# Patient Record
Sex: FEMALE | Employment: FULL TIME | URBAN - METROPOLITAN AREA
[De-identification: names, ages, dates, MRNs, and addresses within clinical notes are randomized per-mention and may not be internally consistent; named-entity substitution may affect disease eponyms.]

---

## 2017-07-19 ENCOUNTER — COMMUNICATION - HEALTHEAST (OUTPATIENT)
Dept: HEALTH INFORMATION MANAGEMENT | Facility: CLINIC | Age: 42
End: 2017-07-19

## 2018-08-02 ENCOUNTER — RADIANT APPOINTMENT (OUTPATIENT)
Dept: MAMMOGRAPHY | Facility: CLINIC | Age: 43
End: 2018-08-02
Payer: COMMERCIAL

## 2018-08-02 DIAGNOSIS — Z12.31 VISIT FOR SCREENING MAMMOGRAM: ICD-10-CM

## 2019-07-18 ENCOUNTER — OFFICE VISIT (OUTPATIENT)
Dept: INTERNAL MEDICINE | Facility: CLINIC | Age: 44
End: 2019-07-18
Payer: COMMERCIAL

## 2019-07-18 VITALS
OXYGEN SATURATION: 97 % | HEART RATE: 78 BPM | BODY MASS INDEX: 21.46 KG/M2 | WEIGHT: 125 LBS | DIASTOLIC BLOOD PRESSURE: 68 MMHG | SYSTOLIC BLOOD PRESSURE: 103 MMHG

## 2019-07-18 DIAGNOSIS — R19.5 LOOSE STOOLS: ICD-10-CM

## 2019-07-18 DIAGNOSIS — O24.410 DIET CONTROLLED GESTATIONAL DIABETES MELLITUS (GDM), ANTEPARTUM: ICD-10-CM

## 2019-07-18 DIAGNOSIS — Z13.220 SCREENING FOR HYPERLIPIDEMIA: ICD-10-CM

## 2019-07-18 DIAGNOSIS — Z00.00 ENCOUNTER FOR MEDICAL EXAMINATION TO ESTABLISH CARE: Primary | ICD-10-CM

## 2019-07-18 DIAGNOSIS — K29.50 CHRONIC GASTRITIS WITHOUT BLEEDING, UNSPECIFIED GASTRITIS TYPE: ICD-10-CM

## 2019-07-18 ASSESSMENT — PAIN SCALES - GENERAL: PAINLEVEL: NO PAIN (0)

## 2019-07-18 NOTE — NURSING NOTE
Chief Complaint   Patient presents with     Lipids     Pt would like to discuss lipid panel. Cholesterol was slightly elevated.      GI Problem     Pt would like to discuss a GI problem.      Xiao Branch LPN at 12:08 PM on 7/18/2019.

## 2019-07-18 NOTE — PROGRESS NOTES
"  PRIMARY CARE CENTER         HPI:       Nathanael Vargas is a 43 year old female with PMHx of gestational diabetes who presents for the following with: Lipids (Pt would like to discuss lipid panel. Cholesterol was slightly elevated. ); GI Problem (Pt would like to discuss a GI problem. ); and Establish Care    Patient has loose stools 1-2 times per week.  She denies any fevers, chills, abdominal pain/discomfort, bloating.  No blood or black tarry stools.  Weight has been stable.  Recently under stress because her brother is getting a divorce.  She was having loose stools almost daily for the 2 weeks when the stress was very high.  Patient is under less stress now and loose stools have decreased.  She is worried that she may be allergic to food.  Sometimes notices an \"acidic\" stomach after eating bread.  Patient also gets some enrique-oral redness after drinking soy milk.  She does not notice this reaction when she eats tofu.    Patient had vaginal delivery on 5/14/2017 c/b gestational diabetes.  She took insulin during pregnancy but did not require medication after delivery.  She periodically checks her blood sugar at home and noticed that recently it has been running higher in the 110s.      Problem, Medication and Allergy Lists were reviewed and are current.  Patient is a new patient to this clinic and so  I reviewed/updated the Past Medical History, the Family History and the Social History.          Review of Systems:     ROS  I have personally reviewed and updated the complete ROS on the day of the visit.           Physical Exam:   /68   Pulse 78   Wt 56.7 kg (125 lb)   SpO2 97%   BMI 21.46 kg/m    Body mass index is 21.46 kg/m .  Vitals were reviewed       GENERAL APPEARANCE: healthy, alert and no distress     EYES: EOMI, PERRL     HENT: ear canals and TM's normal      NECK: no adenopathy, no asymmetry, masses, or scars      RESP: lungs clear to auscultation - no rales, rhonchi or wheezes     CV: regular " rates and rhythm, normal S1 S2, no S3 or S4 and no murmur, click or rub     ABDOMEN:  soft, nontender, no HSM or masses and bowel sounds normal     MS: extremities normal- no gross deformities noted, FROM in all extremities.     SKIN: no suspicious lesions or rashes on limited exam     NEURO: Normal strength and tone, mentation intact and speech normal     PSYCH: mentation appears normal and affect normal/bright     LYMPHATICS: No cervical adenopathy        Results:      Results from the last 24 hoursNo results found for this or any previous visit (from the past 24 hour(s)).     Assessment and Plan     Nathanael was seen today for lipids, gi problem and establish care.    Diagnoses and all orders for this visit:    Encounter for medical examination to establish care  Per patient she gets her PAP and mammogram through OB.  Her last mammogram was August 2018 and was normal.  She thinks her PAP was last year and was going to check because it's not in our records.    Screening for hyperlipidemia  Discussed lifestyle modifications.  May discuss starting a statin if lifestyle changes fail to make significant improvement  -     Lipid Profile FASTING; Future    Diet controlled gestational diabetes mellitus (GDM), antepartum  Was on insulin during her last pregnancy but is no longer on any medication.  She checks her blood sugar at home periodically and has recently been in the 110s.  Will check A1C annually.  -     Hemoglobin A1c; Future    Chronic gastritis without bleeding, unspecified gastritis type  Patient has never been tested.  -     H Pylori antigen stool; Future    Loose stools  No alarm symptoms.  Seems to have some association with stress.  She will bring in the food diary to next visit to review.        -      Patient to keep a food diary        -      Gave her a FODMAP handout        -      Follow up with me in one month    Options for treatment and follow-up care were reviewed with the patient. Nathanael Minori  engaged in the decision making process and verbalized understanding of the options discussed and agreed with the final plan.    Lacey Millan MD  Jul 18, 2019    Pt was seen and plan of care discussed with Dr. Montes De Oca.     I was present during the visit and the patient was seen and examined by me in conjunction with the resident.  I discussed the pertinent history, exam, results and plan with the resident and agree with the documentation above with the following exceptions: none.    Elizabeth Montes De Oca MD

## 2019-07-18 NOTE — PATIENT INSTRUCTIONS
[facility] Medication Refill Request Information:  * Please contact your pharmacy regarding ANY request for medication refills.  ** [facility] Prescription Fax = [phone]  * Please allow 3 business days for routine medication refills.  * Please allow 5 business days for controlled substance medication refills.     [facility] Test Result notification information:  *You will be notified with in 7-10 days of your appointment day regarding the results of your test.  If you are on MyChart you will be notified as soon as the provider has reviewed the results and signed off on them.    [facility]: [phone]          [facility]         Internal Medicine Resident                   Continuity Clinic    Who We Are    Resident Continuity Clinic is a part of the [facility] Primary Care Clinic.  Resident physicians see patients independently and establish a relationship with them over the course of their three-year residency program.  As with the Primary Care Clinic, our Resident Continuity Clinic models a group practice.  If your doctor is not available, you will be able to see another resident physician.  At the end of a resident s training, patients will be transitioned to a new resident physician for ongoing care.     We treat patients with a wide array of medical needs from routine physicals, to acute illnesses, to diabetes and blood pressure management, to complex medical illness.  What is a Resident Physician?    Resident physicians hold medical degrees and are doctors. They are training to become specialists in Internal Medicine. They work under the supervision of board-certified faculty physicians.  Expectations for Your Care    We strive to provide accessible, quality care at all times.    In order to provide this care, it is best to see your primary care resident doctor consistently rather switching between providers.  In the event you do see another physician, you should schedule  a follow-up visit with your usual primary care doctor.    If you are transitioning your care from another clinic, it is helpful to have your records available for your doctor to review.    We do not prescribe controlled substances, such as ADD medications or narcotic pain medications, on your first visit.  We will review your health records and concerns prior to devising a treatment plan with you in order to provide the best care.      Clinic Services     Extended clinic hours; patient  to help navigate your visit;  parking; laboratory and imaging services with evening and weekend hours    Multiple medical and surgical specialties in one building    Complementary services, including Nutrition, Integrative Medicine, Pharmacy consultations, Mental and Behavioral Health, Sports Medicine and Physical Therapy    Thank You    We would like to thank you for choosing the [facility] Internal Medicine Resident Continuity Clinic for your primary care. You are making a priceless contribution to the training of the next generation of health care practitioners.     Contact us at [phone] for appointments or questions.    Resident Clinic Hours are Tuesdays and Thursdays, 7:30am-5:00pm    Residents   Vernon Aguirre MD  (Male)   Jonatan Akins MD   (Male)   Leeann Womack MD  (Female)  Lacey Millan MD   (Female)   Rachel Villegas MD   (Female)    Cherise Jackman MD    (Female)   Ricardo Grossman MD  (Male)   Reginald Ang MD  (Male)    Kristina Padilla MD  (Female)   Blair Chacon MD  (Female)   Jerry Becker MD    (Female)   Andrea Alberto MD  (Male)   Flo Arroyo MD  (Male)   Maxwell Horn MD  (Male)   FRANKLYN Suresh MD   (Male)   Donato Wade MD  (Male)    Alyse Vigil MD (Female)   Tad Botello MD  (Male)   Jerrod Cervantes MD  (Male)   Alvarez Weir MD  (Male)   Peyton Do MD    (Female)   Silver Harvey MD  (Female)     Supervising Physicians   MD Alex Bishop,  MD Kristi Cardenas, MD Gigi Grubbs, MD Dajuan Hutchison, MD Yanely Starks, MD Elizabeth Montes De Oca, MD Jose L Yan, MD Akua Kothari MD

## 2019-08-20 DIAGNOSIS — Z13.220 SCREENING FOR HYPERLIPIDEMIA: ICD-10-CM

## 2019-08-20 DIAGNOSIS — O24.410 DIET CONTROLLED GESTATIONAL DIABETES MELLITUS (GDM), ANTEPARTUM: ICD-10-CM

## 2019-08-20 LAB
CHOLEST SERPL-MCNC: 196 MG/DL
HBA1C MFR BLD: 5.9 % (ref 0–5.6)
HDLC SERPL-MCNC: 52 MG/DL
LDLC SERPL CALC-MCNC: 122 MG/DL
NONHDLC SERPL-MCNC: 144 MG/DL
TRIGL SERPL-MCNC: 108 MG/DL

## 2019-08-21 DIAGNOSIS — K29.50 CHRONIC GASTRITIS WITHOUT BLEEDING, UNSPECIFIED GASTRITIS TYPE: ICD-10-CM

## 2019-08-22 LAB — H PYLORI AG STL QL IA: NEGATIVE

## 2019-09-03 ENCOUNTER — TELEPHONE (OUTPATIENT)
Dept: INTERNAL MEDICINE | Facility: CLINIC | Age: 44
End: 2019-09-03

## 2019-09-03 NOTE — TELEPHONE ENCOUNTER
New right eye floaters and flashing starting 2 weeks ago  Flashing less frequent now  Many small floaters with one larger floaters  Mostly unchanged in past week  Visual acuity feels worse-- still able to read    No shadow/curtain in vision    No eye pain    Pt unable to come tomorrow AM  Scheduled Thursday with Dr. Guevara at [facility] location  Pt aware of date/time/location  Pt aware to call for any sudden vision changes    Frantz Redman RN RN 1:31 PM 09/03/19          [facility] Call Center    Phone Message    May a detailed message be left on voicemail: yes    Reason for Call: Symptoms or Concerns     If patient has red-flag symptoms, warm transfer to triage line    Current symptom or concern: Floaters and Flashers    Symptoms have been present for:  2 week(s)    Has patient previously been seen for this? No    By PCP    Date:     Are there any new or worsening symptoms? Yes:       Action Taken: Message routed to:  [facility] ([facility] Pt would like to schedule a new appt. in the clinic for this. Please call her as soon as possible to discuss

## 2019-09-05 ENCOUNTER — OFFICE VISIT (OUTPATIENT)
Dept: OPHTHALMOLOGY | Facility: CLINIC | Age: 44
End: 2019-09-05
Attending: OPHTHALMOLOGY
Payer: COMMERCIAL

## 2019-09-05 DIAGNOSIS — H11.131 PRIMARY ACQUIRED MELANOSIS OF CONJUNCTIVA OF RIGHT EYE: ICD-10-CM

## 2019-09-05 DIAGNOSIS — H52.13 MYOPIA OF BOTH EYES: Primary | ICD-10-CM

## 2019-09-05 DIAGNOSIS — H43.391 VITREOUS SYNERESIS OF RIGHT EYE: ICD-10-CM

## 2019-09-05 PROBLEM — Z86.32 HISTORY OF INSULIN CONTROLLED GESTATIONAL DIABETES MELLITUS (GDM): Status: ACTIVE | Noted: 2017-07-20

## 2019-09-05 PROBLEM — O46.90 VAGINAL BLEEDING IN PREGNANCY: Status: ACTIVE | Noted: 2019-09-05

## 2019-09-05 PROBLEM — Z34.80 PRENATAL CARE, SUBSEQUENT PREGNANCY: Status: ACTIVE | Noted: 2019-09-05

## 2019-09-05 PROCEDURE — G0463 HOSPITAL OUTPT CLINIC VISIT: HCPCS | Mod: ZF

## 2019-09-05 ASSESSMENT — VISUAL ACUITY
OD_CC: 20/25
OD_CC+: -2
OS_CC: 20/20
METHOD: SNELLEN - LINEAR
CORRECTION_TYPE: GLASSES

## 2019-09-05 ASSESSMENT — TONOMETRY
OD_IOP_MMHG: 18
IOP_METHOD: TONOPEN
OS_IOP_MMHG: 18

## 2019-09-05 ASSESSMENT — SLIT LAMP EXAM - LIDS
COMMENTS: NORMAL
COMMENTS: NORMAL

## 2019-09-05 ASSESSMENT — REFRACTION_WEARINGRX
OD_AXIS: 170
OD_CYLINDER: +0.25
SPECS_TYPE: SVL
OD_SPHERE: -6.00
OS_SPHERE: -6.00
OS_CYLINDER: +0.50

## 2019-09-05 ASSESSMENT — CONF VISUAL FIELD
METHOD: COUNTING FINGERS
OD_NORMAL: 1
OS_NORMAL: 1

## 2019-09-05 ASSESSMENT — EXTERNAL EXAM - LEFT EYE: OS_EXAM: NORMAL

## 2019-09-05 ASSESSMENT — EXTERNAL EXAM - RIGHT EYE: OD_EXAM: NORMAL

## 2019-09-05 NOTE — PROGRESS NOTES
HPI  Nathanael Vargas is a 44 year old female who presents for right eye floaters and flashes past 2 weeks in superior field of her vision.     Last eye exam 1 year ago.     POH: High myopia  GTTs: None  PMH: Gestational diabetes in past but no current DM or HTN  FH: No glaucoma or AMD  SH: Non-smoker    Assessment & Plan    1. Vitreous syneresis   - No PVD   - No retinal tear on SD exam   - RD precautions discussed    2. High myopia    3. CHUCKIE OD   - 9:00, flat   - Observe  -----------------------------------------------------------------------------------      Return 1 year    Shivam Guevara M.D.  Ophthalmology, PGY-4    Attending Physician Attestation:  Complete documentation of historical and exam elements from today's encounter can be found in the full encounter summary report (not reduplicated in this progress note).  I personally obtained the chief complaint(s) and history of present illness.  I confirmed and edited as necessary the review of systems, past medical/surgical history, family history, social history, and examination findings as documented by others; and I examined the patient myself.  I personally reviewed the relevant tests, images, and reports as documented above.  I formulated and edited as necessary the assessment and plan and discussed the findings and management plan with the patient and family. . - Kenneth Vines MD

## 2019-10-17 ENCOUNTER — COMMUNICATION - HEALTHEAST (OUTPATIENT)
Dept: TELEHEALTH | Facility: CLINIC | Age: 44
End: 2019-10-17

## 2019-10-17 ENCOUNTER — AMBULATORY - HEALTHEAST (OUTPATIENT)
Dept: NURSING | Facility: CLINIC | Age: 44
End: 2019-10-17

## 2019-11-07 ENCOUNTER — HEALTH MAINTENANCE LETTER (OUTPATIENT)
Age: 44
End: 2019-11-07

## 2020-11-29 ENCOUNTER — HEALTH MAINTENANCE LETTER (OUTPATIENT)
Age: 45
End: 2020-11-29

## 2021-02-14 ENCOUNTER — HEALTH MAINTENANCE LETTER (OUTPATIENT)
Age: 46
End: 2021-02-14

## 2021-04-15 ENCOUNTER — AMBULATORY - HEALTHEAST (OUTPATIENT)
Dept: NURSING | Facility: CLINIC | Age: 46
End: 2021-04-15

## 2021-04-29 ENCOUNTER — OFFICE VISIT - HEALTHEAST (OUTPATIENT)
Dept: INTERNAL MEDICINE | Facility: CLINIC | Age: 46
End: 2021-04-29

## 2021-04-29 DIAGNOSIS — Z00.00 ROUTINE GENERAL MEDICAL EXAMINATION AT A HEALTH CARE FACILITY: ICD-10-CM

## 2021-04-29 DIAGNOSIS — Z12.31 VISIT FOR SCREENING MAMMOGRAM: ICD-10-CM

## 2021-04-29 DIAGNOSIS — Z13.1 SCREENING FOR DIABETES MELLITUS: ICD-10-CM

## 2021-04-29 DIAGNOSIS — E78.5 HYPERLIPIDEMIA LDL GOAL <100: ICD-10-CM

## 2021-04-29 DIAGNOSIS — Z86.32 HISTORY OF INSULIN CONTROLLED GESTATIONAL DIABETES MELLITUS (GDM): ICD-10-CM

## 2021-04-29 DIAGNOSIS — R59.1 LYMPHADENOPATHY: ICD-10-CM

## 2021-04-29 DIAGNOSIS — K52.9 CHRONIC DIARRHEA: ICD-10-CM

## 2021-04-29 ASSESSMENT — MIFFLIN-ST. JEOR: SCORE: 1276.7

## 2021-05-04 ENCOUNTER — HOSPITAL ENCOUNTER (OUTPATIENT)
Dept: MAMMOGRAPHY | Facility: CLINIC | Age: 46
Discharge: HOME | End: 2021-05-04
Attending: INTERNAL MEDICINE
Payer: COMMERCIAL

## 2021-05-04 DIAGNOSIS — Z12.31 VISIT FOR SCREENING MAMMOGRAM: ICD-10-CM

## 2021-05-05 ENCOUNTER — AMBULATORY - HEALTHEAST (OUTPATIENT)
Dept: LAB | Facility: CLINIC | Age: 46
End: 2021-05-05

## 2021-05-05 DIAGNOSIS — E78.5 HYPERLIPIDEMIA LDL GOAL <100: ICD-10-CM

## 2021-05-05 DIAGNOSIS — Z13.1 SCREENING FOR DIABETES MELLITUS: ICD-10-CM

## 2021-05-05 DIAGNOSIS — Z86.32 HISTORY OF INSULIN CONTROLLED GESTATIONAL DIABETES MELLITUS (GDM): ICD-10-CM

## 2021-05-05 DIAGNOSIS — R59.1 LYMPHADENOPATHY: ICD-10-CM

## 2021-05-05 LAB
ALBUMIN SERPL-MCNC: 3.8 G/DL (ref 3.5–5)
ALP SERPL-CCNC: 69 U/L (ref 45–120)
ALT SERPL W P-5'-P-CCNC: 46 U/L (ref 0–45)
ANION GAP SERPL CALCULATED.3IONS-SCNC: 7 MMOL/L (ref 5–18)
AST SERPL W P-5'-P-CCNC: 35 U/L (ref 0–40)
BASOPHILS # BLD AUTO: 0.1 THOU/UL (ref 0–0.2)
BASOPHILS NFR BLD AUTO: 1 % (ref 0–2)
BILIRUB SERPL-MCNC: 0.4 MG/DL (ref 0–1)
BUN SERPL-MCNC: 12 MG/DL (ref 8–22)
CALCIUM SERPL-MCNC: 8.5 MG/DL (ref 8.5–10.5)
CHLORIDE BLD-SCNC: 106 MMOL/L (ref 98–107)
CHOLEST SERPL-MCNC: 218 MG/DL
CO2 SERPL-SCNC: 27 MMOL/L (ref 22–31)
CREAT SERPL-MCNC: 0.68 MG/DL (ref 0.6–1.1)
EOSINOPHIL # BLD AUTO: 0.3 THOU/UL (ref 0–0.4)
EOSINOPHIL NFR BLD AUTO: 6 % (ref 0–6)
ERYTHROCYTE [DISTWIDTH] IN BLOOD BY AUTOMATED COUNT: 11.4 % (ref 11–14.5)
FASTING STATUS PATIENT QL REPORTED: YES
GFR SERPL CREATININE-BSD FRML MDRD: >60 ML/MIN/1.73M2
GLUCOSE BLD-MCNC: 99 MG/DL (ref 70–125)
HBA1C MFR BLD: 5.5 %
HCT VFR BLD AUTO: 38.8 % (ref 35–47)
HDLC SERPL-MCNC: 46 MG/DL
HGB BLD-MCNC: 12.9 G/DL (ref 12–16)
IMM GRANULOCYTES # BLD: 0 THOU/UL
IMM GRANULOCYTES NFR BLD: 0 %
LDLC SERPL CALC-MCNC: 132 MG/DL
LYMPHOCYTES # BLD AUTO: 2 THOU/UL (ref 0.8–4.4)
LYMPHOCYTES NFR BLD AUTO: 35 % (ref 20–40)
MCH RBC QN AUTO: 31.2 PG (ref 27–34)
MCHC RBC AUTO-ENTMCNC: 33.2 G/DL (ref 32–36)
MCV RBC AUTO: 94 FL (ref 80–100)
MONOCYTES # BLD AUTO: 0.3 THOU/UL (ref 0–0.9)
MONOCYTES NFR BLD AUTO: 5 % (ref 2–10)
NEUTROPHILS # BLD AUTO: 2.9 THOU/UL (ref 2–7.7)
NEUTROPHILS NFR BLD AUTO: 53 % (ref 50–70)
PLATELET # BLD AUTO: 298 THOU/UL (ref 140–440)
PMV BLD AUTO: 9.7 FL (ref 7–10)
POTASSIUM BLD-SCNC: 3.9 MMOL/L (ref 3.5–5)
PROT SERPL-MCNC: 6.7 G/DL (ref 6–8)
RBC # BLD AUTO: 4.14 MILL/UL (ref 3.8–5.4)
SODIUM SERPL-SCNC: 140 MMOL/L (ref 136–145)
TRIGL SERPL-MCNC: 198 MG/DL
TSH SERPL DL<=0.005 MIU/L-ACNC: 2.06 UIU/ML (ref 0.3–5)
WBC: 5.5 THOU/UL (ref 4–11)

## 2021-05-06 ENCOUNTER — AMBULATORY - HEALTHEAST (OUTPATIENT)
Dept: NURSING | Facility: CLINIC | Age: 46
End: 2021-05-06

## 2021-05-11 ENCOUNTER — RECORDS - HEALTHEAST (OUTPATIENT)
Dept: RADIOLOGY | Facility: CLINIC | Age: 46
End: 2021-05-11

## 2021-06-05 VITALS
DIASTOLIC BLOOD PRESSURE: 82 MMHG | SYSTOLIC BLOOD PRESSURE: 118 MMHG | BODY MASS INDEX: 23.21 KG/M2 | WEIGHT: 139.3 LBS | HEART RATE: 84 BPM | HEIGHT: 65 IN | OXYGEN SATURATION: 98 %

## 2021-06-17 NOTE — PATIENT INSTRUCTIONS - HE
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  She comes in today to investigate a tiny palpable lymph node left side of the neck, just below the ear, which I am quite convinced is entirely benign, and could have enlarged as a physiologic immune response to the Pfizer COVID-19 vaccine that she received on April 15, 2021.  Going issue by issue:    Benign lymph node left side of neck.  I told her that lymph nodes can fluctuate in size in response to immune system stimuli such as a vaccine or a viral infection.  She is not experiencing any fever or other lumps or bumps anywhere that would raise a concern for malignancy or disseminated infection.  She does need routine laboratory tests, so I will include a complete blood count with differential, as well as a metabolic panel.  Her thyroid is palpably normal.    I believe she can go ahead and get her second dose of Pfizer COVID-19 vaccine in the first or second week of May.    History of gestational diabetes with her second pregnancy.  This was in 2017, and she had an elevated A1c of 5.9.  She did take insulin during pregnancy, but no hypoglycemic medication after delivery.  I am going to include an A1c on her next set of blood work.  I told her that gestational diabetes does increase her risk for developing future type 2 diabetes.  It is important that she maintain healthy lifestyle as she ages, which means controlling carbohydrate intake, control weight, and get plenty of exercise.  I reminded her that persons of  ancestry are more prone to develop type 2 diabetes with weight gain.    Loose watery stools, which has been a longstanding phenomena greater than a decade.  She describes 1 or sometimes 2 large loose BMs.  She avoids lactose.  TSH thyroid test was normal when checked in 2017, but I will include that on this  round of blood testing.  An H. pylori stool antigen test was negative August 2019.    Hyperlipidemia with elevated LDL cholesterol.  Lipid panel from December 2017 had an LDL of 150.  It wa s 122 when measured August 2019. We will check a fasting lipid panel on this visit  In her favor, she has no history of smoking.    Fibrous breast.  Screening mammogram done with tomosynthesis in August 2018 reported heterogeneously dense breast tissue, otherwise negative.  She is age 45, and probably should get another mammogram this year, and then after age 50, screening mammography is done annually.    Other preventive health.  I told her that average risk colon cancer screening can start at age 50.  This could be done with colonoscopy (every 10 years, unless polyps are found) or stool test (Cologuard) which done every 3 years.    She is not yet in menopause, still has periods.  When she enters menopause, that would be the time to get a baseline bone mineral density scan.  I would suggest that she continue to have an annual exam with her gynecologist.

## 2021-06-17 NOTE — PROGRESS NOTES
Office Visit - New Patient   Geovanna Vargas   45 y.o.  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  She comes in today to investigate a tiny palpable lymph node left side of the neck, just below the ear, which I am quite convinced is entirely benign, and could have enlarged as a physiologic immune response to the Pfizer COVID-19 vaccine that she received on April 15, 2021.  Going issue by issue:    Benign lymph node left side of neck.  I told her that lymph nodes can fluctuate in size in response to immune system stimuli such as a vaccine or a viral infection.  She is not experiencing any fever or other lumps or bumps anywhere that would raise a concern for malignancy or disseminated infection.  She does need routine laboratory tests, so I will include a complete blood count with differential, as well as a metabolic panel.  Her thyroid is palpably normal.    I believe she can go ahead and get her second dose of Pfizer COVID-19 vaccine in the first or second week of May.    History of gestational diabetes with her second pregnancy.  This was in 2017, and she had an elevated A1c of 5.9.  She did take insulin during pregnancy, but no hypoglycemic medication after delivery.  I am going to include an A1c on her next set of blood work.  I told her that gestational diabetes does increase her risk for developing future type 2 diabetes.  It is important that she maintain healthy lifestyle as she ages, which means controlling carbohydrate intake, control weight, and get plenty of exercise.  I reminded her that persons of  ancestry are more prone to develop type 2 diabetes with weight gain.    Loose watery stools, which has been a longstanding phenomena greater than a decade.  She  describes 1 or sometimes 2 large loose BMs.  She avoids lactose.  TSH thyroid test was normal when checked in 2017, but I will include that on this round of blood testing.  An H. pylori stool antigen test was negative August 2019.    Hyperlipidemia with elevated LDL cholesterol.  Lipid panel from December 2017 had an LDL of 150.  It wa s 122 when measured August 2019. We will check a fasting lipid panel on this visit  In her favor, she has no history of smoking.    Fibrous breast.  Screening mammogram done with tomosynthesis in August 2018 reported heterogeneously dense breast tissue, otherwise negative.  She is age 45, and probably should get another mammogram this year, and then after age 50, screening mammography is done annually.    Other preventive health.  I told her that average risk colon cancer screening can start at age 50.  This could be done with colonoscopy (every 10 years, unless polyps are found) or stool test (Cologuard) which done every 3 years.    She is not yet in menopause, still has periods.  When she enters menopause, that would be the time to get a baseline bone mineral density scan.  I would suggest that she continue to have an annual exam with her gynecologist.      ---------------------------------------------------------------------------------------------------------------------------  Chief Complaint   Chief Complaint   Patient presents with     Establish Care     Concerns regarding lumps on neck        ---------------------------------------------------------------------------------------------------------------------------  History of Present Illness  This 45 y.o. 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  She comes in today to investigate a tiny palpable lymph node left side of the neck, just below the ear, which I am quite  convinced is entirely benign, and could have enlarged as a physiologic immune response to the Pfizer COVID-19 vaccine that she received on April 15, 2021.  Going issue by issue:    Benign lymph node left side of neck.  I told her that lymph nodes can fluctuate in size in response to immune system stimuli such as a vaccine or a viral infection.  She is not experiencing any fever or other lumps or bumps anywhere that would raise a concern for malignancy or disseminated infection.  She does need routine laboratory tests, so I will include a complete blood count with differential, as well as a metabolic panel.  Her thyroid is palpably normal.    I believe she can go ahead and get her second dose of Pfizer COVID-19 vaccine in the first or second week of May.      Swollen lymph node left side of neck  Got  COVID-19,PF,Pfizer 04/15/2021     1 hour later felt a fullness left side of neck  Feels OK  No fever    Immunization History   Administered Date(s) Administered     COVID-19,PF,Pfizer 04/15/2021     INFLUENZA,SEASONAL QUAD, PF, =/> 6months 2017     Influenza, inj, historic,unspecified 10/13/2016     Influenza,seasonal,quad inj =/> 6months 10/30/2015, 10/17/2019     Tdap 2014, 2017     Historically healthy    History gestational diabetes mellitus (GDM)  19 0940     Hemoglobin A1C 0 - 5.6 % 5.9High     Patient had vaginal delivery on 2017 c/b gestational diabetes. She took insulin during pregnancy but did not require medication after delivery.     Children 7 and 4    Watery stools  1-2 loose watery BM's  > 10 years  Wt Readings from Last 3 Encounters:   21 139 lb 4.8 oz (63.2 kg)   Avoids lactose  Normal TSH when checked 2017    H pylori negatve   19 1100    Helicobacter pylori Antigen Stool NEG^Negative  Negative     Hyperlipidemia  Lipid profile 2017 had total cholesterol 234, triglycerides 102, HDL 64,      19 0940    Cholesterol <200 mg/dL 196     Triglycerides <150 mg/dL 108    HDL Cholesterol >49 mg/dL 52    LDL Cholesterol Calculated <100 mg/dL 122High       Fibrocystic breast  Screening mammogram with tomosynthesis 8/3/2018 heterogeneously dense breast tissue, otherwise negative      Wt Readings from Last 3 Encounters:   04/29/21 139 lb 4.8 oz (63.2 kg)     BP Readings from Last 3 Encounters:   04/29/21 118/82       No results found for: WBC, HGB, HCT, PLT, CHOL, TRIG, HDL, LDLDIRECT, ALT, AST, NA, K, CL, CREATININE, BUN, CO2, TSH, PSA, INR, GLUF, HGBA1C, MICROALBUR      Review of Systems: A comprehensive review of systems was negative except as noted.  ---------------------------------------------------------------------------------------------------------------------------    Medications and Allergies   Current Outpatient Medications   Medication Sig Dispense Refill     multivitamin with minerals (THERA-M) 9 mg iron-400 mcg Tab tablet Take 1 tablet by mouth daily.       No current facility-administered medications for this visit.      Allergies   Allergen Reactions     Lactase      Pt is allergic to Soy Milk     Latex Itching     Other Environmental Allergy         Active Ambulatory Problems     Diagnosis Date Noted     Joint Pain In The Toes      Resolved Ambulatory Problems     Diagnosis Date Noted     No Resolved Ambulatory Problems     No Additional Past Medical History     No past surgical history on file.   No past medical history on file.     Family and Social History   Family History   Problem Relation Age of Onset     Diabetes Maternal Grandmother      Kidney cancer Maternal Grandmother      Diabetes Paternal Uncle      Diabetes Paternal Uncle         Social History     Tobacco Use     Smoking status: Never Smoker     Smokeless tobacco: Never Used   Substance Use Topics     Alcohol use: Not on file     Drug use: Not on file        Physical Exam   General Appearance:   Appears well    /82 (Patient Site: Right Arm, Patient Position:  "Sitting, Cuff Size: Adult Regular)   Pulse 84   Ht 5' 5.25\" (1.657 m)   Wt 139 lb 4.8 oz (63.2 kg)   SpO2 98%   BMI 23.00 kg/m      General: Alert, in no distress  Skin: No significant lesion seen.  Eyes/nose/throat: Eyes without scleral icterus, eye movements normal, pupils equal and reactive, oropharynx clear, ears with normal TM's  MSK: Neck with good ROM  Lymphatic: + Benign feeling 1 cm lymph node just below her left ear, no other adenopathy appreciated.   Normal to palpation supraclavicular areas, axillae, liver, and spleen  Endocrine: Thyroid with no nodules to palpation  Pulm: Lungs clear to auscultation bilaterally  Cardiac: Heart with regular rate and rhythm, no murmur or gallop  GI: Abdomen soft, nontender. No palpable enlargement of liver or spleen  MSK: Extremities no tenderness or edema  Neuro: Moves all extremities, without focal weakness  Psych: Alert, normal mental status. Normal affect and speech         Additional Information        Woo Perkins MD  Internal Medicine      "

## 2021-09-23 ENCOUNTER — VIRTUAL VISIT (OUTPATIENT)
Dept: INTERNAL MEDICINE | Facility: CLINIC | Age: 46
End: 2021-09-23
Payer: COMMERCIAL

## 2021-09-23 DIAGNOSIS — Z80.0 FAMILY HISTORY OF PANCREATIC CANCER: ICD-10-CM

## 2021-09-23 DIAGNOSIS — R40.4 SPELL OF ALTERED CONSCIOUSNESS: Primary | ICD-10-CM

## 2021-09-23 DIAGNOSIS — K58.0 IRRITABLE BOWEL SYNDROME WITH DIARRHEA: ICD-10-CM

## 2021-09-23 PROCEDURE — 99214 OFFICE O/P EST MOD 30 MIN: CPT | Mod: 95 | Performed by: INTERNAL MEDICINE

## 2021-09-23 NOTE — PATIENT INSTRUCTIONS
1.  Brief spell lasting a few seconds that occurred in late August, when she was standing cooking dinner.  She felt lost control of her body, did not lose consciousness, felt kind of zoned out, but then a few seconds later felt back to normal.  No pain, no shortness of breath.    I told her that I do not have a lot to go on in terms of symptomatology to figure out what sort of work-up to do for the spell.  He does not recurred in the last month or so, and she has felt generally well.  I asked her to write down the details of a future spell, which might direct the work-up more in the neurologic versus cardiovascular direction.    2.  Mother recently diagnosed with pancreas cancer.  Professor Vargas asked about screening test for pancreas cancer and I told her that unfortunately there is not a screening regimen for pancreatic cancer, but I told her to be on the look out for any symptoms that would raise such concerns, and then we would pursue a diagnostic work-up.   Sam does not drink alcohol, has no history of chronic pancreatitis, and does not have diabetes.    3.  Sporadic approximately weekly loose stools or diarrhea, which I think is irritable bowel syndrome.  Professor Vargas told me that multiple family members have similar bowel patterns.  I told her to be on the look out for triggering foods such as caffeine, lactose, fatty foods.  I also told her about the role of sleep sermons or anxiety as potential triggers of irritable bowel syndrome.  I suggested she keep a symptom diary, to try to find any clues in terms of triggers.

## 2021-09-23 NOTE — PROGRESS NOTES
Nathanael is a 46 year old who is being evaluated via a billable video visit.      How would you like to obtain your AVS? MyChart  If the video visit is dropped, the invitation should be resent by: Text to cell phone: [phone]  Will anyone else be joining your video visit? No      Video Start Time: 9:30 AM      Jazz Huffman is a 46 year old who presents for the following health issues     Video visit with Professor Vargas, who had 3 symptoms that she wants to discuss.    During video visit she appeared well.    1.  Brief spell lasting a few seconds that occurred in late August, when she was standing cooking dinner.  She felt lost control of her body, did not lose consciousness, felt kind of zoned out, but then a few seconds later felt back to normal.  No pain, no shortness of breath.    I told her that I do not have a lot to go on in terms of symptomatology to figure out what sort of work-up to do for the spell.  He does not recurred in the last month or so, and she has felt generally well.  I asked her to write down the details of a future spell, which might direct the work-up more in the neurologic versus cardiovascular direction.    2.  Mother recently diagnosed with pancreas cancer.  Professor Vargas asked about screening test for pancreas cancer and I told her that unfortunately there is not a screening regimen for pancreatic cancer, but I told her to be on the look out for any symptoms that would raise such concerns, and then we would pursue a diagnostic work-up.   Sam does not drink alcohol, has no history of chronic pancreatitis, and does not have diabetes.    3.  Sporadic approximately weekly loose stools or diarrhea, which I think is irritable bowel syndrome.  Professor Vargas told me that multiple family members have similar bowel patterns.  I told her to be on the look out for triggering foods such as caffeine, lactose, fatty foods.  I also told her about the role of sleep sermons or  anxiety as potential triggers of irritable bowel syndrome.  I suggested she keep a symptom diary, to try to find any clues in terms of triggers.          Video-Visit Details    Type of service:  Video Visit    Video End Time:9:50 AM    Originating Location (pt. Location): Home    Distant Location (provider location):  [facility]     Platform used for Video Visit: [employer]ann[employer]

## 2021-09-25 ENCOUNTER — HEALTH MAINTENANCE LETTER (OUTPATIENT)
Age: 46
End: 2021-09-25

## 2021-11-09 ENCOUNTER — IMMUNIZATION (OUTPATIENT)
Dept: NURSING | Facility: CLINIC | Age: 46
End: 2021-11-09
Payer: COMMERCIAL

## 2021-11-09 PROCEDURE — 0004A PR COVID VAC PFIZER DIL RECON 30 MCG/0.3 ML IM: CPT

## 2021-11-09 PROCEDURE — 91300 PR COVID VAC PFIZER DIL RECON 30 MCG/0.3 ML IM: CPT

## 2022-01-13 ENCOUNTER — TRANSFERRED RECORDS (OUTPATIENT)
Dept: HEALTH INFORMATION MANAGEMENT | Facility: CLINIC | Age: 47
End: 2022-01-13
Payer: COMMERCIAL

## 2022-01-15 ENCOUNTER — HEALTH MAINTENANCE LETTER (OUTPATIENT)
Age: 47
End: 2022-01-15

## 2022-03-23 ENCOUNTER — TRANSFERRED RECORDS (OUTPATIENT)
Dept: HEALTH INFORMATION MANAGEMENT | Facility: CLINIC | Age: 47
End: 2022-03-23
Payer: COMMERCIAL

## 2022-06-14 ENCOUNTER — TRANSFERRED RECORDS (OUTPATIENT)
Dept: HEALTH INFORMATION MANAGEMENT | Facility: CLINIC | Age: 47
End: 2022-06-14
Payer: COMMERCIAL

## 2022-06-14 LAB — TSH SERPL-ACNC: 2.61 UIU/ML (ref 0.45–4.5)

## 2022-08-09 ENCOUNTER — TRANSFERRED RECORDS (OUTPATIENT)
Dept: HEALTH INFORMATION MANAGEMENT | Facility: CLINIC | Age: 47
End: 2022-08-09

## 2022-08-19 ENCOUNTER — TRANSFERRED RECORDS (OUTPATIENT)
Dept: HEALTH INFORMATION MANAGEMENT | Facility: CLINIC | Age: 47
End: 2022-08-19

## 2022-08-21 ENCOUNTER — HEALTH MAINTENANCE LETTER (OUTPATIENT)
Age: 47
End: 2022-08-21

## 2022-12-26 ENCOUNTER — HEALTH MAINTENANCE LETTER (OUTPATIENT)
Age: 47
End: 2022-12-26

## 2023-04-16 ENCOUNTER — HEALTH MAINTENANCE LETTER (OUTPATIENT)
Age: 48
End: 2023-04-16

## 2023-04-23 ENCOUNTER — OFFICE VISIT (OUTPATIENT)
Dept: FAMILY MEDICINE | Facility: CLINIC | Age: 48
End: 2023-04-23
Payer: COMMERCIAL

## 2023-04-23 VITALS
SYSTOLIC BLOOD PRESSURE: 112 MMHG | WEIGHT: 142.4 LBS | BODY MASS INDEX: 23.52 KG/M2 | TEMPERATURE: 97.6 F | OXYGEN SATURATION: 96 % | HEART RATE: 81 BPM | DIASTOLIC BLOOD PRESSURE: 74 MMHG

## 2023-04-23 DIAGNOSIS — R07.0 THROAT PAIN: Primary | ICD-10-CM

## 2023-04-23 LAB
DEPRECATED S PYO AG THROAT QL EIA: NEGATIVE
GROUP A STREP BY PCR: NOT DETECTED

## 2023-04-23 PROCEDURE — 87651 STREP A DNA AMP PROBE: CPT | Performed by: PHYSICIAN ASSISTANT

## 2023-04-23 PROCEDURE — 99213 OFFICE O/P EST LOW 20 MIN: CPT | Performed by: PHYSICIAN ASSISTANT

## 2023-04-23 NOTE — PATIENT INSTRUCTIONS
Throat    Rapid Strep test was negative.     If overnight test returns positive, we will call tomorrow and call in antibiotic prescription.    No notification means that overnight test returned negative.    Symptoms are likely due to viral infection that will resolve on its own in 1-2 weeks.    May continue with symptomatic treatments including:  - Salt water gargles  - Warm beverages such as a non-caffeinated tea with honey  - Throat drops  - Ibuprofen or acetaminophen for pain or fever relief    If fevers not coming down with acetaminophen or ibuprofen, shortness of breath, not tolerating oral liquid intake, drooling, or stiff neck, return for re-evaluation immediately. Otherwise, if no improvement in the next week, follow up with your primary care provider.

## 2023-06-01 ENCOUNTER — MYC MEDICAL ADVICE (OUTPATIENT)
Dept: INTERNAL MEDICINE | Facility: CLINIC | Age: 48
End: 2023-06-01

## 2023-06-01 ENCOUNTER — OFFICE VISIT (OUTPATIENT)
Dept: INTERNAL MEDICINE | Facility: CLINIC | Age: 48
End: 2023-06-01
Payer: COMMERCIAL

## 2023-06-01 VITALS
HEART RATE: 69 BPM | DIASTOLIC BLOOD PRESSURE: 60 MMHG | HEIGHT: 65 IN | WEIGHT: 140.7 LBS | SYSTOLIC BLOOD PRESSURE: 100 MMHG | OXYGEN SATURATION: 99 % | RESPIRATION RATE: 16 BRPM | BODY MASS INDEX: 23.44 KG/M2 | TEMPERATURE: 97.4 F

## 2023-06-01 DIAGNOSIS — E78.00 ELEVATED LDL CHOLESTEROL LEVEL: ICD-10-CM

## 2023-06-01 DIAGNOSIS — Z86.32 HISTORY OF GESTATIONAL DIABETES: ICD-10-CM

## 2023-06-01 DIAGNOSIS — E78.00 ELEVATED LDL CHOLESTEROL LEVEL: Primary | ICD-10-CM

## 2023-06-01 DIAGNOSIS — R53.82 CHRONIC FATIGUE: ICD-10-CM

## 2023-06-01 DIAGNOSIS — R41.840 DIFFICULTY CONCENTRATING: Primary | ICD-10-CM

## 2023-06-01 DIAGNOSIS — K58.0 IRRITABLE BOWEL SYNDROME WITH DIARRHEA: ICD-10-CM

## 2023-06-01 DIAGNOSIS — Z12.31 VISIT FOR SCREENING MAMMOGRAM: ICD-10-CM

## 2023-06-01 LAB
ALBUMIN SERPL BCG-MCNC: 4.4 G/DL (ref 3.5–5.2)
ALP SERPL-CCNC: 62 U/L (ref 35–104)
ALT SERPL W P-5'-P-CCNC: 14 U/L (ref 10–35)
ANION GAP SERPL CALCULATED.3IONS-SCNC: 11 MMOL/L (ref 7–15)
AST SERPL W P-5'-P-CCNC: 26 U/L (ref 10–35)
BILIRUB SERPL-MCNC: 0.6 MG/DL
BUN SERPL-MCNC: 12.5 MG/DL (ref 6–20)
CALCIUM SERPL-MCNC: 9.4 MG/DL (ref 8.6–10)
CHLORIDE SERPL-SCNC: 103 MMOL/L (ref 98–107)
CHOLEST SERPL-MCNC: 252 MG/DL
CREAT SERPL-MCNC: 0.63 MG/DL (ref 0.51–0.95)
DEPRECATED HCO3 PLAS-SCNC: 22 MMOL/L (ref 22–29)
ERYTHROCYTE [DISTWIDTH] IN BLOOD BY AUTOMATED COUNT: 11.7 % (ref 10–15)
GFR SERPL CREATININE-BSD FRML MDRD: >90 ML/MIN/1.73M2
GLUCOSE SERPL-MCNC: 105 MG/DL (ref 70–99)
HBA1C MFR BLD: 5.3 % (ref 0–5.6)
HCT VFR BLD AUTO: 41.4 % (ref 35–47)
HDLC SERPL-MCNC: 43 MG/DL
HGB BLD-MCNC: 13.8 G/DL (ref 11.7–15.7)
LDLC SERPL CALC-MCNC: 159 MG/DL
MCH RBC QN AUTO: 30.9 PG (ref 26.5–33)
MCHC RBC AUTO-ENTMCNC: 33.3 G/DL (ref 31.5–36.5)
MCV RBC AUTO: 93 FL (ref 78–100)
NONHDLC SERPL-MCNC: 209 MG/DL
PLATELET # BLD AUTO: 331 10E3/UL (ref 150–450)
POTASSIUM SERPL-SCNC: 4.6 MMOL/L (ref 3.4–5.3)
PROT SERPL-MCNC: 7.3 G/DL (ref 6.4–8.3)
RBC # BLD AUTO: 4.47 10E6/UL (ref 3.8–5.2)
SODIUM SERPL-SCNC: 136 MMOL/L (ref 136–145)
TRIGL SERPL-MCNC: 248 MG/DL
TSH SERPL DL<=0.005 MIU/L-ACNC: 3.19 UIU/ML (ref 0.3–4.2)
WBC # BLD AUTO: 6.6 10E3/UL (ref 4–11)

## 2023-06-01 PROCEDURE — 36415 COLL VENOUS BLD VENIPUNCTURE: CPT | Performed by: INTERNAL MEDICINE

## 2023-06-01 PROCEDURE — 80061 LIPID PANEL: CPT | Performed by: INTERNAL MEDICINE

## 2023-06-01 PROCEDURE — 83036 HEMOGLOBIN GLYCOSYLATED A1C: CPT | Performed by: INTERNAL MEDICINE

## 2023-06-01 PROCEDURE — 99214 OFFICE O/P EST MOD 30 MIN: CPT | Performed by: INTERNAL MEDICINE

## 2023-06-01 PROCEDURE — 80053 COMPREHEN METABOLIC PANEL: CPT | Performed by: INTERNAL MEDICINE

## 2023-06-01 PROCEDURE — 85027 COMPLETE CBC AUTOMATED: CPT | Performed by: INTERNAL MEDICINE

## 2023-06-01 PROCEDURE — 84443 ASSAY THYROID STIM HORMONE: CPT | Performed by: INTERNAL MEDICINE

## 2023-06-01 NOTE — PROGRESS NOTES
Office Visit - Follow Up   Geovanna Vargas   47 year old female    Date of Visit: 6/1/2023    Chief Complaint   Patient presents with     Pain     Neck Pain, Brain Fog x Half A Year        -------------------------------------------------------------------------------------------------------------------------  Assessment and Plan    Follow-up multiple issues  47-year-old woman, historically very healthy, works as an  of [employer] science at [employer].      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  She used to be able to pull all nighters with no problem.  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  She told me that sometimes she will do several hours of reading on the subject and then forget what forgot what she read.    She told me that mornings really bothersome, and her head feels kind of foggy.  She is a very busy mom, has 2 small kids ages 9 and 6, and sometimes her sleeping hours are a little bit irregular.  She believes she averages approximately 6 hours of sleep per night.  Her  and her son told her that she snores, so we should consider the possibility of sleep apnea.    She worried about potential metabolic factors, and furthermore she does have a history of gestational diabetes with both of her pregnancies.    Today May June 1, 2023, lets check comprehensive metabolic panel, TSH thyroid test, blood cell counts, check lipid panel since she has history of elevated LDL cholesterol.    If all of those metabolic factors are okay, then I would advise her to work on improving sleep as a first priority.  Regular exercise and healthy eating helps with sleep.  She should try to keep her sleeping hours as consistent as possible.  Minimize interruptions.  Bedroom  environment needs to be optimized for sleep.    If these measures are not successful, my next step would be to send her to the sleep clinic for consultation and possibly be tested for obstructive sleep apnea.    At the moment, I am not seeing any worrisome neurologic signs or symptoms that would raise concern for an organic brain problem or prompted us to get neuroimaging.    History of gestational diabetes with her second pregnancy.  This was in 2017, and she had an elevated A1c of 5.9.  She did take insulin during pregnancy, but no hypoglycemic medication after delivery.  I am going to include an A1c on her next set of blood work.  I told her that gestational diabetes does increase her risk for developing future type 2 diabetes.  It is important that she maintain healthy lifestyle as she ages, which means controlling carbohydrate intake, control weight, and get plenty of exercise.  I reminded her that persons of  ancestry are more prone to develop type 2 diabetes with weight gain.    History of advanced adenomatous colon polyp, 10 mm ascending colon 2022, needs 3-year recheck which would be 2025    Sporadic approximately weekly loose stools or diarrhea, which I think is irritable bowel syndrome.    She avoids lactose.    Upper endoscopy 2022 revealed small gastric erosions and duodenal lymphocytosis, a nonspecific inflammatory finding. H. pylori test was negative  Afterwards she took about a years worth of what was probably a proton pump inhibitor, then decided to stop it, but her GI symptoms are not too bad these days    She has noticed that wheat containing foods might make her symptoms worse, so I think is worth an experiment to try to eliminate those components from her diet, regardless of the results of any medical tests.  If her symptoms improve, then she has an additional management factor.    Family history pancreas cancer (mother  2022)       Benign lymph node  left side of neck.  I told her that lymph nodes can fluctuate in size in response to immune system stimuli such as a vaccine or a viral infection.  She is not experiencing any fever or other lumps or bumps anywhere that would raise a concern for malignancy or disseminated infection.  She does need routine laboratory tests, so I will include a complete blood count with differential, as well as a metabolic panel.  Her thyroid is palpably normal.    Hyperlipidemia with elevated LDL cholesterol.   Latest Reference Range & Units 05/05/21 09:54   Cholesterol <=199 mg/dL 218 (H)   Patient Fasting?  Yes   HDL Cholesterol >=50 mg/dL 46 (L)   Hemoglobin A1C <=5.6 % 5.5   LDL Cholesterol Calculated <=129 mg/dL 132 (H)   Triglycerides <=149 mg/dL 198 (H)     Fibrous breast  BILATERAL FULL FIELD DIGITAL SCREENING MAMMOGRAM WITH TOMOSYNTHESIS  Performed on: 5/4/21    Immunization History   Administered Date(s) Administered     COVID-19 Bivalent 12+ (Pfizer) 01/25/2023     COVID-19 MONOVALENT 12+ (Pfizer) 04/15/2021, 05/06/2021, 11/09/2021     Flu, Unspecified 10/13/2016     Influenza Vaccine >6 months (Alfuria,Fluzone) 12/04/2017     Influenza Vaccine, 6+MO IM (QUADRIVALENT W/PRESERVATIVES) 10/30/2015, 10/17/2019     TDAP (Adacel,Boostrix) 02/03/2014, 03/16/2017       --------------------------------------------------------------------------------------------------------------------------  History of Present Illness  This 47 year old old     Follow-up multiple issues  47-year-old woman, historically very healthy, works as an  of [employer] science at [employer].      Generalized malaise, fatigability, some difficulty concentrating particularly with intense cognitive tasks    Professor Vargas has noticed over the last year that she seems just not as sharp as usual.  She used to be able to pull all nighters with no problem.  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  She told me that sometimes she will do several hours of reading on the subject and then forget what forgot what she read.    She told me that mornings really bothersome, and her head feels kind of foggy.  She is a very busy mom, has 2 small kids ages 9 and 6, and sometimes her sleeping hours are a little bit irregular.  She believes she averages approximately 6 hours of sleep per night.  Her  and her son told her that she snores, so we should consider the possibility of sleep apnea.    She worried about potential metabolic factors, and furthermore she does have a history of gestational diabetes with both of her pregnancies.    Today May June 1, 2023, lets check comprehensive metabolic panel, TSH thyroid test, blood cell counts, check lipid panel since she has history of elevated LDL cholesterol.    If all of those metabolic factors are okay, then I would advise her to work on improving sleep as a first priority.  Regular exercise and healthy eating helps with sleep.  She should try to keep her sleeping hours as consistent as possible.  Minimize interruptions.  Bedroom environment needs to be optimized for sleep.    If these measures are not successful, my next step would be to send her to the sleep clinic for consultation and possibly be tested for obstructive sleep apnea.    At the moment, I am not seeing any worrisome neurologic signs or symptoms that would raise concern for an organic brain problem or prompted us to get neuroimaging.      Wt Readings from Last 3 Encounters:   06/01/23 63.8 kg (140 lb 11.2 oz)   04/23/23 64.6 kg (142 lb 6.4 oz)   04/29/21 63.2 kg (139 lb 4.8 oz)     BP Readings from Last 3 Encounters:   06/01/23 100/60   04/23/23 112/74   04/29/21 118/82       Review of Systems: A comprehensive review of systems was negative except as noted.  ---------------------------------------------------------------------------------------------------------------------------    Medications,  "Allergies, Social, and Problem List       No current outpatient medications on file.     Allergies   Allergen Reactions     Latex Itching     Seasonal Allergies      Soybeans-Tilactase (Lactase) [Dairy Digestive]      Pt is allergic to Soy Milk     Social History     Tobacco Use     Smoking status: Never     Smokeless tobacco: Never   Substance Use Topics     Alcohol use: Yes     Comment: once a month maybe     Drug use: No     Patient Active Problem List   Diagnosis     GDM, class A2     History of insulin controlled gestational diabetes mellitus (GDM)     Prenatal care, subsequent pregnancy     Vaginal bleeding in pregnancy     Vaginal delivery     Family history of pancreatic cancer        Reviewed, reconciled and updated       Physical Exam   General Appearance:       /60   Pulse 69   Temp 97.4  F (36.3  C)   Resp 16   Ht 1.657 m (5' 5.25\")   Wt 63.8 kg (140 lb 11.2 oz)   SpO2 99%   BMI 23.23 kg/m      Appears generally well.  I palpated her neck, and I do not appreciate any adenopathy.     Additional Information   I spent 30 minutes on this encounter, including reviewing interval history since last visit, examining the patient, explaining and counseling the issues enumerated in the Assessment and Plan (patient given a copy), ordering indicated tests       DILCIA SAGE MD, MD          "

## 2023-06-01 NOTE — PATIENT INSTRUCTIONS
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  She used to be able to pull all nighters with no problem.  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  She told me that sometimes she will do several hours of reading on the subject and then forget what forgot what she read.    She told me that mornings really bothersome, and her head feels kind of foggy.  She is a very busy mom, has 2 small kids ages 9 and 6, and sometimes her sleeping hours are a little bit irregular.  She believes she averages approximately 6 hours of sleep per night.  Her  and her son told her that she snores, so we should consider the possibility of sleep apnea.    She worried about potential metabolic factors, and furthermore she does have a history of gestational diabetes with both of her pregnancies.    Today May June 1, 2023, lets check comprehensive metabolic panel, TSH thyroid test, blood cell counts, check lipid panel since she has history of elevated LDL cholesterol.    If all of those metabolic factors are okay, then I would advise her to work on improving sleep as a first priority.  Regular exercise and healthy eating helps with sleep.  She should try to keep her sleeping hours as consistent as possible.  Minimize interruptions.  Bedroom environment needs to be optimized for sleep.    If these measures are not successful, my next step would be to send her to the sleep clinic for consultation and possibly be tested for obstructive sleep apnea.    At the moment, I am not seeing any worrisome neurologic signs or symptoms that would raise concern for an organic brain problem or prompted  us to get neuroimaging.    History of gestational diabetes with her second pregnancy.  This was in 2017, and she had an elevated A1c of 5.9.  She did take insulin during pregnancy, but no hypoglycemic medication after delivery.  I am going to include an A1c on her next set of blood work.  I told her that gestational diabetes does increase her risk for developing future type 2 diabetes.  It is important that she maintain healthy lifestyle as she ages, which means controlling carbohydrate intake, control weight, and get plenty of exercise.  I reminded her that persons of  ancestry are more prone to develop type 2 diabetes with weight gain.    History of advanced adenomatous colon polyp, 10 mm ascending colon 2022, needs 3-year recheck which would be 2025    Sporadic approximately weekly loose stools or diarrhea, which I think is irritable bowel syndrome.    She avoids lactose.    Upper endoscopy 2022 revealed small gastric erosions and duodenal lymphocytosis, a nonspecific inflammatory finding. H. pylori test was negative  Afterwards she took about a years worth of what was probably a proton pump inhibitor, then decided to stop it, but her GI symptoms are not too bad these days    She has noticed that wheat containing foods might make her symptoms worse, so I think is worth an experiment to try to eliminate those components from her diet, regardless of the results of any medical tests.  If her symptoms improve, then she has an additional management factor.    Family history pancreas cancer (mother  2022)       Benign lymph node left side of neck.  I told her that lymph nodes can fluctuate in size in response to immune system stimuli such as a vaccine or a viral infection.  She is not experiencing any fever or other lumps or bumps anywhere that would raise a concern for malignancy or disseminated infection.  She does need routine laboratory tests, so I will include a complete  blood count with differential, as well as a metabolic panel.  Her thyroid is palpably normal.    Hyperlipidemia with elevated LDL cholesterol.   Latest Reference Range & Units 05/05/21 09:54   Cholesterol <=199 mg/dL 218 (H)   Patient Fasting?  Yes   HDL Cholesterol >=50 mg/dL 46 (L)   Hemoglobin A1C <=5.6 % 5.5   LDL Cholesterol Calculated <=129 mg/dL 132 (H)   Triglycerides <=149 mg/dL 198 (H)     Fibrous breast  BILATERAL FULL FIELD DIGITAL SCREENING MAMMOGRAM WITH TOMOSYNTHESIS  Performed on: 5/4/21    Immunization History   Administered Date(s) Administered    COVID-19 Bivalent 12+ (Pfizer) 01/25/2023    COVID-19 MONOVALENT 12+ (Pfizer) 04/15/2021, 05/06/2021, 11/09/2021    Flu, Unspecified 10/13/2016    Influenza Vaccine >6 months (Alfuria,Fluzone) 12/04/2017    Influenza Vaccine, 6+MO IM (QUADRIVALENT W/PRESERVATIVES) 10/30/2015, 10/17/2019    TDAP (Adacel,Boostrix) 02/03/2014, 03/16/2017

## 2023-06-16 ENCOUNTER — ANCILLARY ORDERS (OUTPATIENT)
Dept: INTERNAL MEDICINE | Facility: CLINIC | Age: 48
End: 2023-06-16

## 2023-06-16 ENCOUNTER — HOSPITAL ENCOUNTER (OUTPATIENT)
Dept: CT IMAGING | Facility: CLINIC | Age: 48
Discharge: HOME | End: 2023-06-16
Attending: INTERNAL MEDICINE | Admitting: INTERNAL MEDICINE
Payer: COMMERCIAL

## 2023-06-16 DIAGNOSIS — E78.00 ELEVATED LDL CHOLESTEROL LEVEL: ICD-10-CM

## 2023-06-16 LAB
CV CALCIUM SCORE AGATSTON LM: 0
CV CALCIUM SCORING AGATSON LAD: 0
CV CALCIUM SCORING AGATSTON CX: 0
CV CALCIUM SCORING AGATSTON RCA: 0
CV CALCIUM SCORING AGATSTON TOTAL: 0

## 2023-06-16 PROCEDURE — 75571 CT HRT W/O DYE W/CA TEST: CPT | Mod: 26 | Performed by: INTERNAL MEDICINE

## 2023-06-16 PROCEDURE — 75571 CT HRT W/O DYE W/CA TEST: CPT

## 2023-06-22 ENCOUNTER — ANCILLARY PROCEDURE (OUTPATIENT)
Dept: MAMMOGRAPHY | Facility: CLINIC | Age: 48
End: 2023-06-22
Attending: INTERNAL MEDICINE
Payer: COMMERCIAL

## 2023-06-22 ENCOUNTER — ANCILLARY ORDERS (OUTPATIENT)
Dept: INTERNAL MEDICINE | Facility: CLINIC | Age: 48
End: 2023-06-22

## 2023-06-22 DIAGNOSIS — Z12.31 VISIT FOR SCREENING MAMMOGRAM: ICD-10-CM

## 2023-06-22 PROCEDURE — 77067 SCR MAMMO BI INCL CAD: CPT | Mod: GC | Performed by: RADIOLOGY

## 2023-06-22 PROCEDURE — 77063 BREAST TOMOSYNTHESIS BI: CPT | Mod: GC | Performed by: RADIOLOGY

## 2023-08-04 ENCOUNTER — TELEPHONE (OUTPATIENT)
Dept: NURSING | Facility: CLINIC | Age: 48
End: 2023-08-04
Payer: COMMERCIAL

## 2023-08-04 NOTE — TELEPHONE ENCOUNTER
Rn lft msg for patient regarding [facility] appointment. [facility]     Grace GUERRERO RN

## 2023-10-21 ENCOUNTER — IMMUNIZATION (OUTPATIENT)
Dept: FAMILY MEDICINE | Facility: CLINIC | Age: 48
End: 2023-10-21
Payer: COMMERCIAL

## 2023-10-21 PROCEDURE — 90471 IMMUNIZATION ADMIN: CPT

## 2023-10-21 PROCEDURE — 91320 SARSCV2 VAC 30MCG TRS-SUC IM: CPT

## 2023-10-21 PROCEDURE — 90480 ADMN SARSCOV2 VAC 1/ONLY CMP: CPT

## 2023-10-21 PROCEDURE — 90686 IIV4 VACC NO PRSV 0.5 ML IM: CPT

## 2023-10-25 ENCOUNTER — OFFICE VISIT (OUTPATIENT)
Dept: INTERNAL MEDICINE | Facility: CLINIC | Age: 48
End: 2023-10-25
Payer: COMMERCIAL

## 2023-10-25 VITALS
HEIGHT: 65 IN | WEIGHT: 138 LBS | DIASTOLIC BLOOD PRESSURE: 68 MMHG | OXYGEN SATURATION: 98 % | HEART RATE: 82 BPM | BODY MASS INDEX: 22.99 KG/M2 | TEMPERATURE: 97.9 F | SYSTOLIC BLOOD PRESSURE: 118 MMHG | RESPIRATION RATE: 16 BRPM

## 2023-10-25 DIAGNOSIS — G47.30 SLEEP DISORDER BREATHING: Primary | ICD-10-CM

## 2023-10-25 PROCEDURE — 99213 OFFICE O/P EST LOW 20 MIN: CPT | Performed by: INTERNAL MEDICINE

## 2023-10-25 NOTE — PATIENT INSTRUCTIONS
48-year-old woman, historically very healthy, works as an  of computer science at [employer]    The issue of focusing on today's visit is  Possibility of sleep disordered breathing, which possibly contributes to her symptom of concentration difficulties.    She reports that she awakens with a choking sensation, and even has dreams about labored breathing     She asked her brother (cardiologist) about this, and I agree with the suggestion that she have a sleep consultation, undergo a sleep study (which I told her could be a home sleep study), and if there is sleep disordered breathing, it could potentially be treated with an oral repositioning appliance, or CPAP.    Her  and her son told her that she snores, so we should consider the possibility of sleep apnea.    She told me that sometimes she has a runny nose, but there does not seem to be a nasal congestion problem.    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  She used to be able to pull all nighters with no problem.      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     She told me that mornings really bothersome, and her head feels kind of foggy.    She is a very busy mom, has 2 small kids ages 9 and 6, and sometimes her sleeping hours are a little bit irregular.  She believes she averages approximately 6 hours of sleep per night.      If all of those metabolic factors are okay, then I would advise her to work on improving sleep as a first priority.     Regular exercise and healthy eating helps with sleep.  She should try to keep her sleeping hours as consistent as possible.  Minimize interruptions.  Bedroom environment needs to be optimized for sleep.      History of gestational diabetes with her second pregnancy.  This was in 2017, and she had an elevated A1c of 5.9.   She did take insulin during pregnancy, but no hypoglycemic medication after delivery.  I am going to include an A1c on her next set of blood work.  I told her that gestational diabetes does increase her risk for developing future type 2 diabetes.  It is important that she maintain healthy lifestyle as she ages, which means controlling carbohydrate intake, control weight, and get plenty of exercise.  I reminded her that persons of  ancestry are more prone to develop type 2 diabetes with weight gain.     History of advanced adenomatous colon polyp, 10 mm ascending colon 2022, needs 3-year recheck which would be 2025     Sporadic approximately weekly loose stools or diarrhea, which I think is irritable bowel syndrome.    She avoids lactose.    Upper endoscopy 2022 revealed small gastric erosions and duodenal lymphocytosis, a nonspecific inflammatory finding. H. pylori test was negative  Afterwards she took about a years worth of what was probably a proton pump inhibitor, then decided to stop it, but her GI symptoms are not too bad these days     She has noticed that wheat containing foods might make her symptoms worse, so I think is worth an experiment to try to eliminate those components from her diet, regardless of the results of any medical tests.  If her symptoms improve, then she has an additional management factor.     Family history pancreas cancer (mother  2022)      Benign lymph node left side of neck.  I told her that lymph nodes can fluctuate in size in response to immune system stimuli such as a vaccine or a viral infection.  She is not experiencing any fever or other lumps or bumps anywhere that would raise a concern for malignancy or disseminated infection.  She does need routine laboratory tests, so I will include a complete blood count with differential, as well as a metabolic panel.  Her thyroid is palpably normal.     Hyperlipidemia with elevated LDL  cholesterol.    Latest Reference Range & Units 05/05/21 09:54   Cholesterol <=199 mg/dL 218 (H)   Patient Fasting?   Yes   HDL Cholesterol >=50 mg/dL 46 (L)   Hemoglobin A1C <=5.6 % 5.5   LDL Cholesterol Calculated <=129 mg/dL 132 (H)   Triglycerides <=149 mg/dL 198 (H)      6-  Calcium Scoring: The total Agatston score is 0. A calcium score of zero places the individual in the lowest quartile when compared to an age and gender matched control group and implies a low risk of cardiac events in the next 10 years. (less than 1% per year).     Fibrous breast  BILATERAL FULL FIELD DIGITAL SCREENING MAMMOGRAM WITH TOMOSYNTHESIS  Performed on: 6/22/23

## 2023-10-25 NOTE — PROGRESS NOTES
Office Visit - Follow Up   Geovanna ESTELA Vargas   48 year old female    Date of Visit: 10/25/2023    Chief Complaint   Patient presents with    Sleep Problem     Sleep study        -------------------------------------------------------------------------------------------------------------------------  Assessment and Plan      48-year-old woman, historically very healthy, works as an  of [employer] science at [employer]    The issue of focusing on today's visit is  Possibility of sleep disordered breathing, which possibly contributes to her symptom of concentration difficulties.    She reports that she awakens with a choking sensation, and even has dreams about labored breathing     She asked her brother (cardiologist) about this, and I agree with the suggestion that she have a sleep consultation, undergo a sleep study (which I told her could be a home sleep study), and if there is sleep disordered breathing, it could potentially be treated with an oral repositioning appliance, or CPAP.    Her  and her son told her that she snores, so we should consider the possibility of sleep apnea.    She told me that sometimes she has a runny nose, but there does not seem to be a nasal congestion problem.    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  She used to be able to pull all nighters with no problem.      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     She told me that mornings really bothersome, and her head feels kind of foggy.    She is a very busy mom, has 2 small kids ages 9 and 6, and sometimes her sleeping hours are a little bit irregular.  She believes she averages approximately 6 hours of sleep per night.      If all of those metabolic factors are okay, then I would advise her to work on improving sleep as a first priority.      Regular exercise and healthy eating helps with sleep.  She should try to keep her sleeping hours as consistent as possible.  Minimize interruptions.  Bedroom environment needs to be optimized for sleep.      History of gestational diabetes with her second pregnancy.  This was in , and she had an elevated A1c of 5.9.  She did take insulin during pregnancy, but no hypoglycemic medication after delivery.  I am going to include an A1c on her next set of blood work.  I told her that gestational diabetes does increase her risk for developing future type 2 diabetes.  It is important that she maintain healthy lifestyle as she ages, which means controlling carbohydrate intake, control weight, and get plenty of exercise.  I reminded her that persons of  ancestry are more prone to develop type 2 diabetes with weight gain.     History of advanced adenomatous colon polyp, 10 mm ascending colon 2022, needs 3-year recheck which would be 2025     Sporadic approximately weekly loose stools or diarrhea, which I think is irritable bowel syndrome.    She avoids lactose.    Upper endoscopy 2022 revealed small gastric erosions and duodenal lymphocytosis, a nonspecific inflammatory finding. H. pylori test was negative  Afterwards she took about a years worth of what was probably a proton pump inhibitor, then decided to stop it, but her GI symptoms are not too bad these days     She has noticed that wheat containing foods might make her symptoms worse, so I think is worth an experiment to try to eliminate those components from her diet, regardless of the results of any medical tests.  If her symptoms improve, then she has an additional management factor.     Family history pancreas cancer (mother  2022)      Benign lymph node left side of neck.  I told her that lymph nodes can fluctuate in size in response to immune system stimuli such as a vaccine or a viral infection.  She is not  experiencing any fever or other lumps or bumps anywhere that would raise a concern for malignancy or disseminated infection.  She does need routine laboratory tests, so I will include a complete blood count with differential, as well as a metabolic panel.  Her thyroid is palpably normal.     Hyperlipidemia with elevated LDL cholesterol.    Latest Reference Range & Units 05/05/21 09:54   Cholesterol <=199 mg/dL 218 (H)   Patient Fasting?   Yes   HDL Cholesterol >=50 mg/dL 46 (L)   Hemoglobin A1C <=5.6 % 5.5   LDL Cholesterol Calculated <=129 mg/dL 132 (H)   Triglycerides <=149 mg/dL 198 (H)      6-  Calcium Scoring: The total Agatston score is 0. A calcium score of zero places the individual in the lowest quartile when compared to an age and gender matched control group and implies a low risk of cardiac events in the next 10 years. (less than 1% per year).     Fibrous breast  BILATERAL FULL FIELD DIGITAL SCREENING MAMMOGRAM WITH TOMOSYNTHESIS  Performed on: 6/22/23    --------------------------------------------------------------------------------------------------------------------------  History of Present Illness  This 48 year old old     The issue of focusing on today's visit is  Possibility of sleep disordered breathing, which possibly contributes to her symptom of concentration difficulties.    She reports that she awakens with a choking sensation, and even has dreams about labored breathing     She asked her brother (cardiologist) about this, and I agree with the suggestion that she have a sleep consultation, undergo a sleep study (which I told her could be a home sleep study), and if there is sleep disordered breathing, it could potentially be treated with an oral repositioning appliance, or CPAP.    Her  and her son told her that she snores, so we should consider the possibility of sleep apnea.    She told me that sometimes she has a runny nose, but there does not seem to be a nasal congestion  "problem.    Wt Readings from Last 3 Encounters:   10/25/23 62.6 kg (138 lb)   06/01/23 63.8 kg (140 lb 11.2 oz)   04/23/23 64.6 kg (142 lb 6.4 oz)     BP Readings from Last 3 Encounters:   10/25/23 118/68   06/01/23 100/60   04/23/23 112/74     ---------------------------------------------------------------------------------------------------------------------------    Medications, Allergies, Social, and Problem List       Current Outpatient Medications   Medication Sig Dispense Refill    Multiple Vitamin (MULTI VITAMIN DAILY PO)        Allergies   Allergen Reactions    Latex Itching    Seasonal Allergies     Soybeans-Tilactase (Lactase) [Dairy Digestive]      Pt is allergic to Soy Milk     Social History     Tobacco Use    Smoking status: Never     Passive exposure: Never    Smokeless tobacco: Never   Substance Use Topics    Alcohol use: Yes     Comment: once a month maybe    Drug use: No     Patient Active Problem List   Diagnosis    GDM, class A2    History of insulin controlled gestational diabetes mellitus (GDM)    Prenatal care, subsequent pregnancy    Vaginal bleeding in pregnancy    Vaginal delivery    Family history of pancreatic cancer    Irritable bowel syndrome with diarrhea    Chronic fatigue    Difficulty concentrating        Reviewed, reconciled and updated       Physical Exam   General Appearance:       /68 (BP Location: Right arm, Patient Position: Sitting, Cuff Size: Adult Regular)   Pulse 82   Temp 97.9  F (36.6  C)   Resp 16   Ht 1.657 m (5' 5.25\")   Wt 62.6 kg (138 lb)   LMP  (LMP Unknown)   SpO2 98%   BMI 22.79 kg/m      Appears well, affect, judgment, and insight seem fine     Additional Information   I spent 20 minutes on this encounter, including reviewing interval history since last visit, examining the patient, explaining and counseling the issues enumerated in the Assessment and Plan (patient given a copy), ordering referrals.       DILCIA SAGE MD, MD          Answers " submitted by the patient for this visit:  General Questionnaire (Submitted on 10/25/2023)  Chief Complaint: Chronic problems general questions HPI Form  How many servings of fruits and vegetables do you eat daily?: 2-3  On average, how many sweetened beverages do you drink each day (Examples: soda, juice, sweet tea, etc.  Do NOT count diet or artificially sweetened beverages)?: 1  How many minutes a day do you exercise enough to make your heart beat faster?: 10 to 19  How many days a week do you exercise enough to make your heart beat faster?: 3 or less  How many days per week do you miss taking your medication?: 0  General Concern (Submitted on 10/25/2023)  Chief Complaint: Chronic problems general questions HPI Form  What is the reason for your visit today?: sleeping issues  When did your symptoms begin?: More than a month

## 2023-11-22 ENCOUNTER — VIRTUAL VISIT (OUTPATIENT)
Dept: SLEEP MEDICINE | Facility: CLINIC | Age: 48
End: 2023-11-22
Attending: INTERNAL MEDICINE
Payer: COMMERCIAL

## 2023-11-22 VITALS — HEIGHT: 64 IN | WEIGHT: 135 LBS | BODY MASS INDEX: 23.05 KG/M2

## 2023-11-22 DIAGNOSIS — R53.83 OTHER FATIGUE: ICD-10-CM

## 2023-11-22 DIAGNOSIS — G47.30 SLEEP DISORDER BREATHING: ICD-10-CM

## 2023-11-22 DIAGNOSIS — R06.83 SNORING: Primary | ICD-10-CM

## 2023-11-22 DIAGNOSIS — F51.5 NIGHTMARES: ICD-10-CM

## 2023-11-22 PROCEDURE — 99203 OFFICE O/P NEW LOW 30 MIN: CPT | Mod: VID | Performed by: INTERNAL MEDICINE

## 2023-11-22 ASSESSMENT — SLEEP AND FATIGUE QUESTIONNAIRES
HOW LIKELY ARE YOU TO NOD OFF OR FALL ASLEEP IN A CAR, WHILE STOPPED FOR A FEW MINUTES IN TRAFFIC: SLIGHT CHANCE OF DOZING
HOW LIKELY ARE YOU TO NOD OFF OR FALL ASLEEP WHILE LYING DOWN TO REST IN THE AFTERNOON WHEN CIRCUMSTANCES PERMIT: SLIGHT CHANCE OF DOZING
HOW LIKELY ARE YOU TO NOD OFF OR FALL ASLEEP WHILE WATCHING TV: MODERATE CHANCE OF DOZING
HOW LIKELY ARE YOU TO NOD OFF OR FALL ASLEEP WHEN YOU ARE A PASSENGER IN A CAR FOR AN HOUR WITHOUT A BREAK: SLIGHT CHANCE OF DOZING
HOW LIKELY ARE YOU TO NOD OFF OR FALL ASLEEP WHILE SITTING INACTIVE IN A PUBLIC PLACE: SLIGHT CHANCE OF DOZING
HOW LIKELY ARE YOU TO NOD OFF OR FALL ASLEEP WHILE SITTING QUIETLY AFTER LUNCH WITHOUT ALCOHOL: SLIGHT CHANCE OF DOZING
HOW LIKELY ARE YOU TO NOD OFF OR FALL ASLEEP WHILE SITTING AND TALKING TO SOMEONE: WOULD NEVER DOZE
HOW LIKELY ARE YOU TO NOD OFF OR FALL ASLEEP WHILE SITTING AND READING: SLIGHT CHANCE OF DOZING

## 2023-11-22 ASSESSMENT — PAIN SCALES - GENERAL: PAINLEVEL: NO PAIN (0)

## 2023-11-22 NOTE — PROGRESS NOTES
Virtual Visit Details    Type of service:  Video Visit     Originating Location (pt. Location): Home    Distant Location (provider location):  On-site  Platform used for Video Visit: Nahun    Chief complaint: Consultation requested by Woo Perkins MD for evaluation of sleep disordered breathing    History of Present Illness: 48-year-old female has history of daytime fatigue, difficulty concentrating, mild daytime sleepiness.  She has been having nightmares where the content of the dreams is related to breathing and not being able to breath.  She is not really question the content of this as when she wakes up she does not particularly feel short of breath.  However she has been told about snoring.  She also wakes up with dry mouth and throat.  During the day she can be sleepy and she is gotten sleepy slightly behind the wheel.  Although her [place] underestimates the degree of her symptoms.  She tries to go to bed by 1 AM but often it might not be until 2 AM.  She typically gets up 7:30 or 8 AM. If she did not use an alarm she would sleep longer.  She does think of herself as a night owl.  She typically has 1 caffeinated beverage in the morning.  She will occasionally take naps maybe 3 days a week.  Naps are refreshing.  Once she falls asleep at night she sleeps through the night without needing to get up to go to the bathroom.  These nightmares can happen multiple times per week.    There is no history of restless legs, insomnia, sleepwalking, sleep talking or dream enactment behavior.  She has been told by her  that she grinds her teeth.  She does not wear a mouthguard.  There is no known family history of sleep apnea but her father snores loud.    [place] Sleepiness Scale   Sitting and reading: Slight chance of dozing   Watching TV: Moderate chance of dozing   Sitting, inactive in a public place (e.g. a theatre or a meeting): Slight chance of dozing   As a passenger in a car for an hour without a break:  Slight chance of dozing   Lying down to rest in the afternoon when circumstances permit: Slight chance of dozing   Sitting and talking to someone: Would never doze   Sitting quietly after a lunch without alcohol: Slight chance of dozing   In a car, while stopped for a few minutes in traffic: Slight chance of dozing   Total score - [place]: 8   (Less than 10 normal)    Insomnia Severity Scale  SHELL Total Score: 14  Total score categories:  0-7 = No clinically significant insomnia   8-14 = Subthreshold insomnia   15-21 = Clinical insomnia (moderate severity)  22-28 = Clinical insomnia (severe)    STOP-BANG  Loud Snore   1  Excessively Tired/Sleepy   0-1  Observed apnea   1  Hypertension   0  BMI> 35 kg/m2   0  Age >50   0  Neck >16 in/40cm   ?  Male Gender   0  Total =   2-3  (0-2 low, 3-4 intermediate, 5-8 high risk of OLIVE)      Past Medical History:   Diagnosis Date    NO ACTIVE PROBLEMS        Allergies   Allergen Reactions    Latex Itching    Seasonal Allergies     Soybeans-Tilactase (Lactase) [Dairy Digestive]      Pt is allergic to Soy Milk       Current Outpatient Medications   Medication    Multiple Vitamin (MULTI VITAMIN DAILY PO)     No current facility-administered medications for this visit.       Social History     Socioeconomic History    Marital status: Single     Spouse name: Not on file    Number of children: Not on file    Years of education: Not on file    Highest education level: Not on file   Occupational History    Not on file   Tobacco Use    Smoking status: Never     Passive exposure: Never    Smokeless tobacco: Never   Vaping Use    Vaping Use: Never used   Substance and Sexual Activity    Alcohol use: Yes     Comment: once a month maybe    Drug use: No    Sexual activity: Yes     Partners: Male     Birth control/protection: None   Other Topics Concern    Not on file   Social History Narrative    How much exercise per week? Once a week, jogging    How much calcium per day? suplement       How  much caffeine per day? Stopped drinking anything with caffeine about a month ago    How much vitamin D per day? 2000 iu    Do you/your family wear seatbelts?  Yes    Do you/your family use safety helmets? Yes    Do you/your family use sunscreen? Yes    Do you/your family keep firearms in the home? No    Do you/your family have a smoke detector(s)? Yes        Do you feel safe in your home? Yes    Has anyone ever touched you in an unwanted manner? No         Zachariah R LPN 7/12/13                 Social Determinants of Health     Financial Resource Strain: Low Risk  (10/25/2023)    Financial Resource Strain     Within the past 12 months, have you or your family members you live with been unable to get utilities (heat, electricity) when it was really needed?: No   Food Insecurity: Low Risk  (10/25/2023)    Food Insecurity     Within the past 12 months, did you worry that your food would run out before you got money to buy more?: No     Within the past 12 months, did the food you bought just not last and you didn t have money to get more?: No   Transportation Needs: Low Risk  (10/25/2023)    Transportation Needs     Within the past 12 months, has lack of transportation kept you from medical appointments, getting your medicines, non-medical meetings or appointments, work, or from getting things that you need?: No   Physical Activity: Not on file   Stress: Not on file   Social Connections: Not on file   Interpersonal Safety: Low Risk  (10/25/2023)    Interpersonal Safety     Do you feel physically and emotionally safe where you currently live?: Yes     Within the past 12 months, have you been hit, slapped, kicked or otherwise physically hurt by someone?: No     Within the past 12 months, have you been humiliated or emotionally abused in other ways by your partner or ex-partner?: No   Housing Stability: Low Risk  (10/25/2023)    Housing Stability     Do you have housing? : Yes     Are you worried about losing your housing?:  "No       Family History   Problem Relation Age of Onset    Diabetes Other     Cancer Maternal Uncle         liver ca    Glaucoma No family hx of     Macular Degeneration No family hx of     Diabetes Maternal Grandmother     Kidney Cancer Maternal Grandmother     Diabetes Paternal Uncle     Diabetes Paternal Uncle          EXAM:  Ht 1.626 m (5' 4\")   Wt 61.2 kg (135 lb)   LMP  (LMP Unknown)   BMI 23.17 kg/m    GENERAL: Healthy, alert and no distress  EYES: Eyes grossly normal to inspection.  No obvious scleral/conjunctival abnormalities.  RESP: No audible wheeze, cough, or visible cyanosis.  No visible retractions or increased work of breathing.    SKIN: Visible skin clear. No significant rash, abnormal pigmentation or lesions.  NEURO: Cranial nerves grossly intact.  Mentation and speech appropriate for age.  PSYCH: Mentation appears normal, affect normal, judgement and insight intact, normal speech and appearance well-groomed.       TSH   Date Value Ref Range Status   06/01/2023 3.19 0.30 - 4.20 uIU/mL Final   05/05/2021 2.06 0.30 - 5.00 uIU/mL Final   05/24/2013 81 mcU/mL Final         ASSESSMENT:  48-year-old female with daytime fatigue, difficulty concentrating, borderline excessive daytime sleepiness, nightmares with content related to difficulty breathing, snoring.  Symptoms could be consistent with obstructive sleep apnea.    PLAN:  Recommended evaluation for sleep disordered breathing with home sleep apnea testing.  We reviewed the pathophysiology of sleep apnea, testing options, indications for treatment and treatment options.  I will be contacting her with results of the home sleep apnea test when they are available.  In the meantime, encouraged her to try to sleep on her sides, and increase sleep opportunity in order to avoid sleep deprivation.  She is agreeable with this plan.      32 minutes spent by me on the date of the encounter doing chart review, history and exam, documentation and further " activities per the note    Margarita Cruz M.D.  Pulmonary/Critical Care/Sleep Medicine    [facility]   [address], [address]   [address] [place] [address]   Appointments: [phone]    The above note was dictated using voice recognition software and may include typographical errors. Please contact the author for any clarifications.

## 2023-11-22 NOTE — PATIENT INSTRUCTIONS
"          MY TREATMENT INFORMATION FOR SLEEP APNEA-  Geovanna Vargas    DOCTOR : Margarita Cruz MD    Am I having a sleep study at a sleep center?  --->Due to normal delays, you will be contacted within 2-4 weeks to schedule    Am I having a home sleep study?  --->Watch the video for the device you are using:    -/drop off device-   https://www.AeroScout.com/watch?v=yGGFBdELGhk    -Disposable device sent out require phone/computer application-   https://www.AeroScout.com/watch?v=BCce_vbiwxE      Frequently asked questions:  1. What is Obstructive Sleep Apnea (OLIVE)? OLIVE is the most common type of sleep apnea. Apnea means, \"without breath.\"  Apnea is most often caused by narrowing or collapse of the upper airway as muscles relax during sleep.   Almost everyone has occasional apneas. Most people with sleep apnea have had brief interruptions at night frequently for many years.  The severity of sleep apnea is related to how frequent and severe the events are.   2. What are the consequences of OLIVE? Symptoms include: feeling sleepy during the day, snoring loudly, gasping or stopping of breathing, trouble sleeping, and occasionally morning headaches or heartburn at night.  Sleepiness can be serious and even increase the risk of falling asleep while driving. Other health consequences may include development of high blood pressure and other cardiovascular disease in persons who are susceptible. Untreated OLIVE  can contribute to heart disease, stroke and diabetes.   3. What are the treatment options? In most situations, sleep apnea is a lifelong disease that must be managed with daily therapy. Medications are not effective for sleep apnea and surgery is generally not considered until other therapies have been tried. Your treatment is your choice . Continuous Positive Airway (CPAP) works right away and is the therapy that is effective in nearly everyone. An oral device to hold your jaw forward is usually the next most " reliable option. Other options include postioning devices (to keep you off your back), weight loss, and surgery including a tongue pacing device. There is more detail about some of these options below.  4. Are my sleep studies covered by insurance? Although we will request verification of coverage, we advise you also check in advance of the study to ensure there is coverage.    Important tips for those choosing CPAP and similar devices  For new devices, sign up for device NORBERTO to monitor your device for your followup visits  We encourage you to utilize the [employer] norberto or website (myAir web (resmed.com) ) to monitor your therapy progress and share the data with your healthcare team when you discuss your sleep apnea.                                                    Know your equipment:  CPAP is continuous positive airway pressure that prevents obstructive sleep apnea by keeping the throat from collapsing while you are sleeping. In most cases, the device is  smart  and can slowly self-adjusts if your throat collapses and keeps a record every day of how well you are treated-this information is available to you and your care team.  BPAP is bilevel positive airway pressure that keeps your throat open and also assists each breath with a pressure boost to maintain adequate breathing.  Special kinds of BPAP are used in patients who have inadequate breathing from lung or heart disease. In most cases, the device is  smart  and can slowly self-adjusts to assist breathing. Like CPAP, the device keeps a record of how well you are treated.  Your mask is your connection to the device. You get to choose what feels most comfortable and the staff will help to make sure if fits. Here: are some examples of the different masks that are available: Magnetic mask aids may assist with use but there are safety issues that should be addressed when considering with magnets* ( see end of discussion).       Key points to remember on your  journey with sleep apnea:  Sleep study.  PAP devices often need to be adjusted during a sleep study to show that they are effective and adjusted right.  Good tips to remember: Try wearing just the mask during a quiet time during the day so your body adapts to wearing it. A humidifier is recommended for comfort in most cases to prevent drying of your nose and throat. Allergy medication from your provider may help you if you are having nasal congestion.  Getting settled-in. It takes more than one night for most of us to get used to wearing a mask. Try wearing just the mask during a quiet time during the day so your body adapts to wearing it. A humidifier is recommended for comfort in most cases. Our team will work with you carefully on the first day and will be in contact within 4 days and again at 2 and 4 weeks for advice and remote device adjustments. Your therapy is evaluated by the device each day.   Use it every night. The more you are able to sleep naturally for 7-8 hours, the more likely you will have good sleep and to prevent health risks or symptoms from sleep apnea. Even if you use it 4 hours it helps. Occasionally all of us are unable to use a medical therapy, in sleep apnea, it is not dangerous to miss one night.   Communicate. Call our skilled team on the number provided on the first day if your visit for problems that make it difficult to wear the device. Over 2 out of 3 patients can learn to wear the device long-term with help from our team. Remember to call our team or your sleep providers if you are unable to wear the device as we may have other solutions for those who cannot adapt to mask CPAP therapy. It is recommended that you sleep your sleep provider within the first 3 months and yearly after that if you are not having problems.   Use it for your health. We encourage use of CPAP masks during daytime quiet periods to allow your face and brain to adapt to the sensation of CPAP so that it will be a  more natural sensation to awaken to at night or during naps. This can be very useful during the first few weeks or months of adapting to CPAP though it does not help medically to wear CPAP during wakefulness and  should not be used as a strategy just to meet guidelines.  Take care of your equipment. Make sure you clean your mask and tubing using directions every day and that your filter and mask are replaced as recommended or if they are not working.     *Masks with magnets:  Updated Contraindications  Masks with magnetic components are contraindicated for use by patients where they, or anyone in close physical contact while using the mask, have the following:   Active medical implants that interact with magnets (i.e., pacemakers, implantable cardioverter defibrillators (ICD), neurostimulators, cerebrospinal fluid (CSF) shunts, insulin/infusion pumps)   Metallic implants/objects containing ferromagnetic material (i.e., aneurysm clips/flow disruption devices, embolic coils, stents, valves, electrodes, implants to restore hearing or balance with implanted magnets, ocular implants, metallic splinters in the eye)  Updated Warning  Keep the mask magnets at a safe distance of at least 6 inches (150 mm) away from implants or medical devices that may be adversely affected by magnetic interference. This warning applies to you or anyone in close physical contact with your mask. The magnets are in the frame and lower headgear clips, with a magnetic field strength of up to 400mT. When worn, they connect to secure the mask but may inadvertently detach while asleep.  Implants/medical devices, including those listed within contraindications, may be adversely affected if they change function under external magnetic fields or contain ferromagnetic materials that attract/repel to magnetic fields (some metallic implants, e.g., contact lenses with metal, dental implants, metallic cranial plates, screws, carolina hole covers, and bone  substitute devices). Consult your physician and  of your implant / other medical device for information on the potential adverse effects of magnetic fields.    BESIDES CPAP, WHAT OTHER THERAPIES ARE THERE?    Positioning Device  Positioning devices are generally used when sleep apnea is mild and only occurs on your back.This example shows a pillow that straps around the waist. It may be appropriate for those whose sleep study shows milder sleep apnea that occurs primarily when lying flat on one's back. Preliminary studies have shown benefit but effectiveness at home may need to be verified by a home sleep test. These devices are generally not covered by medical insurance.  Examples of devices that maintain sleeping on the back to prevent snoring and mild sleep apnea.    Belt type body positioner  http://JumpSoft/    Electronic reminder  http://nightshifttherapy.com/            Oral Appliance  What is oral appliance therapy?  An oral appliance device fits on your teeth at night like a retainer used after having braces. The device is made by a specialized dentist and requires several visits over 1-2 months before a manufactured device is made to fit your teeth and is adjusted to prevent your sleep apnea. Once an oral device is working properly, snoring should be improved. A home sleep test may be recommended at that time if to determine whether the sleep apnea is adequately treated.       Some things to remember:  -Oral devices are often, but not always, covered by your medical insurance. Be sure to check with your insurance provider.   -If you are referred for oral therapy, you will be given a list of specialized dentists to consider or you may choose to visit the Web site of the American Academy of Dental Sleep Medicine  -Oral devices are less likely to work if you have severe sleep apnea or are extremely overweight.     More detailed information  An oral appliance is a small acrylic device that fits  over the upper and lower teeth  (similar to a retainer or a mouth guard). This device slightly moves jaw forward, which moves the base of the tongue forward, opens the airway, improves breathing for effective treat snoring and obstructive sleep apnea in perhaps 7 out of 10 people .  The best working devices are custom-made by a dental device  after a mold is made of the teeth 1, 2, 3.  When is an oral appliance indicated?  Oral appliance therapy is recommended as a first-line treatment for patients with primary snoring, mild sleep apnea, and for patients with moderate sleep apnea who prefer appliance therapy to use of CPAP4, 5. Severity of sleep apnea is determined by sleep testing and is based on the number of respiratory events per hour of sleep.   How successful is oral appliance therapy?  The success rate of oral appliance therapy in patients with mild sleep apnea is 75-80% while in patients with moderate sleep apnea it is 50-70%. The chance of success in patients with severe sleep apnea is 40-50%. The research also shows that oral appliances have a beneficial effect on the cardiovascular health of OLIVE patients at the same magnitude as CPAP therapy7.  Oral appliances should be a second-line treatment in cases of severe sleep apnea, but if not completely successful then a combination therapy utilizing CPAP plus oral appliance therapy may be effective. Oral appliances tend to be effective in a broad range of patients although studies show that the patients who have the highest success are females, younger patients, those with milder disease, and less severe obesity. 3, 6.   Finding a dentist that practices dental sleep medicine  Specific training is available through the American Academy of Dental Sleep Medicine for dentists interested in working in the field of sleep. To find a dentist who is educated in the field of sleep and the use of oral appliances, near you, visit the Web site of the American  Academy of Dental Sleep Medicine.    References  1. Dank et al. Objectively measured vs self-reported compliance during oral appliance therapy for sleep-disordered breathing. Chest 2013; 144(5): 8786-5079.  2. Charly et al. Objective measurement of compliance during oral appliance therapy for sleep-disordered breathing. Thorax 2013; 68(1): 91-96.  3. Asmita, et al. Mandibular advancement devices in 620 men and women with OLIVE and snoring: tolerability and predictors of treatment success. Chest 2004; 125: 5261-3535.  4. Christal et al. Oral appliances for snoring and OLIVE: a review. Sleep 2006; 29: 244-262.  5. Verna et al. Oral appliance treatment for OLIVE: an update. J Clin Sleep Med 2014; 10(2): 215-227.  6. Johana et al. Predictors of OSAH treatment outcome. J Dent Res 2007; 86: 4025-5873.      Weight Loss:    Weight loss is a long-term strategy that may improve sleep apnea in some patients.    Weight management is a personal decision and the decision should be based on your interest and the potential benefits.  If you are interested in exploring weight loss strategies, the following discussion covers the impact on weight loss on sleep apnea and the approaches that may be successful.    Being overweight does not necessarily mean you will have health consequences.  Those who have BMI over 35 or over 27 with existing medical conditions carries greater risk.   Weight loss decreases severity of sleep apnea in most people with obesity. For those with mild obesity who have developed snoring with weight gain, even 15-30 pound weight loss can improve and occasionally eliminate sleep apnea.  Structured and life-long dietary and health habits are necessary to lose weight and keep healthier weight levels.     Though there may be significant health benefits from weight loss, long-term weight loss is very difficult to achieve- studies show success with dietary management in less than 10% of people. In  addition, substantial weight loss may require years of dietary control and may be difficult if patients have severe obesity. In these cases, surgical management may be considered.  Finally, older individuals who have tolerated obesity without health complications may be less likely to benefit from weight loss strategies.        Surgery:    Surgery for obstructive sleep apnea is considered generally only when other therapies fail to work. Surgery may be discussed with you if you are having a difficult time tolerating CPAP and or when there is an abnormal structure that requires surgical correction.  Nose and throat surgeries often enlarge the airway to prevent collapse.  Most of these surgeries create pain for 1-2 weeks and up to half of the most common surgeries are not effective throughout life.  You should carefully discuss the benefits and drawbacks to surgery with your sleep provider and surgeon to determine if it is the best solution for you.   More information  Surgery for OLIVE is directed at areas that are responsible for narrowing or complete obstruction of the airway during sleep.  There are a wide range of procedures available to enlarge and/or stabilize the airway to prevent blockage of breathing in the three major areas where it can occur: the palate, tongue, and nasal regions.  Successful surgical treatment depends on the accurate identification of the factors responsible for obstructive sleep apnea in each person.  A personalized approach is required because there is no single treatment that works well for everyone.  Because of anatomic variation, consultation with an examination by a sleep surgeon is a critical first step in determining what surgical options are best for each patient.  In some cases, examination during sedation may be recommended in order to guide the selection of procedures.  Patients will be counseled about risks and benefits as well as the typical recovery course after surgery.  Surgery is typically not a cure for a person s OLIVE.  However, surgery will often significantly improve one s OLIVE severity (termed  success rate ).  Even in the absence of a cure, surgery will decrease the cardiovascular risk associated with OSA7; improve overall quality of life8 (sleepiness, functionality, sleep quality, etc).      Palate Procedures:  Patients with OLIVE often have narrowing of their airway in the region of their tonsils and uvula.  The goals of palate procedures are to widen the airway in this region as well as to help the tissues resist collapse.  Modern palate procedure techniques focus on tissue conservation and soft tissue rearrangement, rather than tissue removal.  Often the uvula is preserved in this procedure. Residual sleep apnea is common in patient after pharyngoplasty with an average reduction in sleep apnea events of 33%2.      Tongue Procedures:  ExamWhile patients are awake, the muscles that surround the throat are active and keep this region open for breathing. These muscles relax during sleep, allowing the tongue and other structures to collapse and block breathing.  There are several different tongue procedures available.  Selection of a tongue base procedure depends on characteristics seen on physical exam.  Generally, procedures are aimed at removing bulky tissues in this area or preventing the back of the tongue from falling back during sleep.  Success rates for tongue surgery range from 50-62%3.    Hypoglossal Nerve Stimulation:  Hypoglossal nerve stimulation has recently received approval from the [place] Food and Drug Administration for the treatment of obstructive sleep apnea.  This is based on research showing that the system was safe and effective in treating sleep apnea6.  Results showed that the median AHI score decreased 68%, from 29.3 to 9.0. This therapy uses an implant system that senses breathing patterns and delivers mild stimulation to airway muscles, which  keeps the airway open during sleep.  The system consists of three fully implanted components: a small generator (similar in size to a pacemaker), a breathing sensor, and a stimulation lead.  Using a small handheld remote, a patient turns the therapy on before bed and off upon awakening.    Candidates for this device must be greater than 18 years of age, have moderate to severe OLIVE (AHI between 15-65), BMI less than 35, have tried CPAP/oral appliance for at least 8 weeks without success, and have appropriate upper airway anatomy (determined by a sleep endoscopy performed by Dr. Sebastian Agustin).    Hypoglossal Nerve Stimulation Pathway:    The sleep surgeon s office will work with the patient through the insurance prior-authorization process (including communications and appeals).    Nasal Procedures:  Nasal obstruction can interfere with nasal breathing during the day and night.  Studies have shown that relief of nasal obstruction can improve the ability of some patients to tolerate positive airway pressure therapy for obstructive sleep apnea1.  Treatment options include medications such as nasal saline, topical corticosteroid and antihistamine sprays, and oral medications such as antihistamines or decongestants. Non-surgical treatments can include external nasal dilators for selected patients. If these are not successful by themselves, surgery can improve the nasal airway either alone or in combination with these other options.      Combination Procedures:  Combination of surgical procedures and other treatments may be recommended, particularly if patients have more than one area of narrowing or persistent positional disease.  The success rate of combination surgery ranges from 66-80%2,3.    References  Ines DOE. The Role of the Nose in Snoring and Obstructive Sleep Apnoea: An Update.  Eur Arch Otorhinolaryngol. 2011; 268: 1365-73.   Mejia SM; Gustavo JA; Rachel MERAZ; Pallanch JF; Suki HERNANDEZ; Matthew ASHLEY; Elías CORDERO.  Surgical modifications of the upper airway for obstructive sleep apnea in adults: a systematic review and meta-analysis. SLEEP 2010;33(10):9735-5177. Nikkie CHARLES. Hypopharyngeal surgery in obstructive sleep apnea: an evidence-based medicine review.  Arch Otolaryngol Head Neck Surg. 2006 Feb;132(2):206-13.  Luis Fernando YH1, Yaquelin Y, Gregorio LAWRENCE. The efficacy of anatomically based multilevel surgery for obstructive sleep apnea. Otolaryngol Head Neck Surg. 2003 Oct;129(4):327-35.  Kezirian E, Goldberg A. Hypopharyngeal Surgery in Obstructive Sleep Apnea: An Evidence-Based Medicine Review. Arch Otolaryngol Head Neck Surg. 2006 Feb;132(2):206-13.  Moises AN et al. Upper-Airway Stimulation for Obstructive Sleep Apnea.  N Engl J Med. 2014 Jan 9;370(2):139-49.  Lorna Y et al. Increased Incidence of Cardiovascular Disease in Middle-aged Men with Obstructive Sleep Apnea. Am J Respir Crit Care Med; 2002 166: 159-165  Torres EM et al. Studying Life Effects and Effectiveness of Palatopharyngoplasty (SLEEP) study: Subjective Outcomes of Isolated Uvulopalatopharyngoplasty. Otolaryngol Head Neck Surg. 2011; 144: 623-631.        WHAT IF I ONLY HAVE SNORING?    Mandibular advancement devices, lateral sleep positioning, long-term weight loss and treatment of nasal allergies have been shown to improve snoring.  Exercising tongue muscles with a game (https://eLearning Connections.Innorange Oy/us/norberto/soundly-reduce-snoring/qc9093313514) or stimulating the tongue during the day with a device (https://doi.org/10.3390/lsf39652019) have improved snoring in some individuals.    Remember to Drive Safe... Drive Alive     Sleep health profoundly affects your health, mood, and your safety.  Thirty three percent of the population (one in three of us) is not getting enough sleep and many have a sleep disorder. Not getting enough sleep or having an untreated / undertreated sleep condition may make us sleepy without even knowing it. In fact, our driving could be dramatically  impaired due to our sleep health. As your provider, here are some things I would like you to know about driving:     Here are some warning signs for impairment and dangerous drowsy driving:              -Having been awake more than 16 hours               -Looking tired               -Eyelid drooping              -Head nodding (it could be too late at this point)              -Driving for more than 30 minutes     Some things you could do to make the driving safer if you are experiencing some drowsiness:              -Stop driving and rest              -Call for transportation              -Make sure your sleep disorder is adequately treated     Some things that have been shown NOT to work when experiencing drowsiness while driving:              -Turning on the radio              -Opening windows              -Eating any  distracting  /  entertaining  foods (e.g., sunflower seeds, candy, or any other)              -Talking on the phone      Your decision may not only impact your life, but also the life of others. Please, remember to drive safe for yourself and all of us.

## 2023-11-22 NOTE — NURSING NOTE
Has patient had flu shot for current/most recent flu season? If so, when? Yes: 10/21/23      Is the patient currently in the state of [place]? YES    Visit mode:VIDEO    If the visit is dropped, the patient can be reconnected by: VIDEO VISIT: Text to cell phone:   Telephone Information:   Mobile [phone]       Will anyone else be joining the visit? NO  (If patient encounters technical issues they should call [phone]    How would you like to obtain your AVS? MyChart    Are changes needed to the allergy or medication list? No    Reason for visit: Consult    Vanessa JENKINS

## 2023-11-22 NOTE — LETTER
11/22/2023         RE: Geovanna Vargas  [address] [facility] [address]        Dear Colleague,    Thank you for referring your patient, Geovanna Vargas, to the [facility] [place]. Please see a copy of my visit note below.    Virtual Visit Details    Type of service:  Video Visit     Originating Location (pt. Location): Home    Distant Location (provider location):  On-site  Platform used for Video Visit: Nahun    Chief complaint: Consultation requested by Woo Perkins MD for evaluation of sleep disordered breathing    History of Present Illness: 48-year-old female has history of daytime fatigue, difficulty concentrating, mild daytime sleepiness.  She has been having nightmares where the content of the dreams is related to breathing and not being able to breath.  She is not really question the content of this as when she wakes up she does not particularly feel short of breath.  However she has been told about snoring.  She also wakes up with dry mouth and throat.  During the day she can be sleepy and she is gotten sleepy slightly behind the wheel.  Although her [place] underestimates the degree of her symptoms.  She tries to go to bed by 1 AM but often it might not be until 2 AM.  She typically gets up 7:30 or 8 AM. If she did not use an alarm she would sleep longer.  She does think of herself as a night owl.  She typically has 1 caffeinated beverage in the morning.  She will occasionally take naps maybe 3 days a week.  Naps are refreshing.  Once she falls asleep at night she sleeps through the night without needing to get up to go to the bathroom.  These nightmares can happen multiple times per week.    There is no history of restless legs, insomnia, sleepwalking, sleep talking or dream enactment behavior.  She has been told by her  that she grinds her teeth.  She does not wear a mouthguard.  There is no known family history of sleep apnea but her father snores loud.    [place]  Sleepiness Scale   Sitting and reading: Slight chance of dozing   Watching TV: Moderate chance of dozing   Sitting, inactive in a public place (e.g. a theatre or a meeting): Slight chance of dozing   As a passenger in a car for an hour without a break: Slight chance of dozing   Lying down to rest in the afternoon when circumstances permit: Slight chance of dozing   Sitting and talking to someone: Would never doze   Sitting quietly after a lunch without alcohol: Slight chance of dozing   In a car, while stopped for a few minutes in traffic: Slight chance of dozing   Total score - [place]: 8   (Less than 10 normal)    Insomnia Severity Scale  SHELL Total Score: 14  Total score categories:  0-7 = No clinically significant insomnia   8-14 = Subthreshold insomnia   15-21 = Clinical insomnia (moderate severity)  22-28 = Clinical insomnia (severe)    STOP-BANG  Loud Snore   1  Excessively Tired/Sleepy   0-1  Observed apnea   1  Hypertension   0  BMI> 35 kg/m2   0  Age >50   0  Neck >16 in/40cm   ?  Male Gender   0  Total =   2-3  (0-2 low, 3-4 intermediate, 5-8 high risk of OLIVE)      Past Medical History:   Diagnosis Date     NO ACTIVE PROBLEMS        Allergies   Allergen Reactions     Latex Itching     Seasonal Allergies      Soybeans-Tilactase (Lactase) [Dairy Digestive]      Pt is allergic to Soy Milk       Current Outpatient Medications   Medication     Multiple Vitamin (MULTI VITAMIN DAILY PO)     No current facility-administered medications for this visit.       Social History     Socioeconomic History     Marital status: Single     Spouse name: Not on file     Number of children: Not on file     Years of education: Not on file     Highest education level: Not on file   Occupational History     Not on file   Tobacco Use     Smoking status: Never     Passive exposure: Never     Smokeless tobacco: Never   Vaping Use     Vaping Use: Never used   Substance and Sexual Activity     Alcohol use: Yes     Comment: once a month  maybe     Drug use: No     Sexual activity: Yes     Partners: Male     Birth control/protection: None   Other Topics Concern     Not on file   Social History Narrative    How much exercise per week? Once a week, jogging    How much calcium per day? suplement       How much caffeine per day? Stopped drinking anything with caffeine about a month ago    How much vitamin D per day? 2000 iu    Do you/your family wear seatbelts?  Yes    Do you/your family use safety helmets? Yes    Do you/your family use sunscreen? Yes    Do you/your family keep firearms in the home? No    Do you/your family have a smoke detector(s)? Yes        Do you feel safe in your home? Yes    Has anyone ever touched you in an unwanted manner? No         Zachariah R LPN 7/12/13                 Social Determinants of Health     Financial Resource Strain: Low Risk  (10/25/2023)    Financial Resource Strain      Within the past 12 months, have you or your family members you live with been unable to get utilities (heat, electricity) when it was really needed?: No   Food Insecurity: Low Risk  (10/25/2023)    Food Insecurity      Within the past 12 months, did you worry that your food would run out before you got money to buy more?: No      Within the past 12 months, did the food you bought just not last and you didn t have money to get more?: No   Transportation Needs: Low Risk  (10/25/2023)    Transportation Needs      Within the past 12 months, has lack of transportation kept you from medical appointments, getting your medicines, non-medical meetings or appointments, work, or from getting things that you need?: No   Physical Activity: Not on file   Stress: Not on file   Social Connections: Not on file   Interpersonal Safety: Low Risk  (10/25/2023)    Interpersonal Safety      Do you feel physically and emotionally safe where you currently live?: Yes      Within the past 12 months, have you been hit, slapped, kicked or otherwise physically hurt by someone?:  "No      Within the past 12 months, have you been humiliated or emotionally abused in other ways by your partner or ex-partner?: No   Housing Stability: Low Risk  (10/25/2023)    Housing Stability      Do you have housing? : Yes      Are you worried about losing your housing?: No       Family History   Problem Relation Age of Onset     Diabetes Other      Cancer Maternal Uncle         liver ca     Glaucoma No family hx of      Macular Degeneration No family hx of      Diabetes Maternal Grandmother      Kidney Cancer Maternal Grandmother      Diabetes Paternal Uncle      Diabetes Paternal Uncle          EXAM:  Ht 1.626 m (5' 4\")   Wt 61.2 kg (135 lb)   LMP  (LMP Unknown)   BMI 23.17 kg/m    GENERAL: Healthy, alert and no distress  EYES: Eyes grossly normal to inspection.  No obvious scleral/conjunctival abnormalities.  RESP: No audible wheeze, cough, or visible cyanosis.  No visible retractions or increased work of breathing.    SKIN: Visible skin clear. No significant rash, abnormal pigmentation or lesions.  NEURO: Cranial nerves grossly intact.  Mentation and speech appropriate for age.  PSYCH: Mentation appears normal, affect normal, judgement and insight intact, normal speech and appearance well-groomed.       TSH   Date Value Ref Range Status   06/01/2023 3.19 0.30 - 4.20 uIU/mL Final   05/05/2021 2.06 0.30 - 5.00 uIU/mL Final   05/24/2013 81 mcU/mL Final         ASSESSMENT:  48-year-old female with daytime fatigue, difficulty concentrating, borderline excessive daytime sleepiness, nightmares with content related to difficulty breathing, snoring.  Symptoms could be consistent with obstructive sleep apnea.    PLAN:  Recommended evaluation for sleep disordered breathing with home sleep apnea testing.  We reviewed the pathophysiology of sleep apnea, testing options, indications for treatment and treatment options.  I will be contacting her with results of the home sleep apnea test when they are available.  In the " meantime, encouraged her to try to sleep on her sides, and increase sleep opportunity in order to avoid sleep deprivation.  She is agreeable with this plan.      32 minutes spent by me on the date of the encounter doing chart review, history and exam, documentation and further activities per the note    Margarita Cruz M.D.  Pulmonary/Critical Care/Sleep Medicine    [facility]   Floor 1, [address]   [address] [place] [address]   Appointments: [phone]    The above note was dictated using voice recognition software and may include typographical errors. Please contact the author for any clarifications.            Again, thank you for allowing me to participate in the care of your patient.        Sincerely,        Margarita Cruz MD

## 2024-01-03 ENCOUNTER — OFFICE VISIT (OUTPATIENT)
Dept: SLEEP MEDICINE | Facility: CLINIC | Age: 49
End: 2024-01-03
Attending: INTERNAL MEDICINE
Payer: COMMERCIAL

## 2024-01-03 DIAGNOSIS — R06.83 SNORING: ICD-10-CM

## 2024-01-03 DIAGNOSIS — F51.5 NIGHTMARES: ICD-10-CM

## 2024-01-03 DIAGNOSIS — G47.30 SLEEP DISORDER BREATHING: ICD-10-CM

## 2024-01-03 DIAGNOSIS — R53.83 OTHER FATIGUE: ICD-10-CM

## 2024-01-03 PROCEDURE — G0399 HOME SLEEP TEST/TYPE 3 PORTA: HCPCS | Performed by: INTERNAL MEDICINE

## 2024-01-03 ASSESSMENT — SLEEP AND FATIGUE QUESTIONNAIRES
HOW LIKELY ARE YOU TO NOD OFF OR FALL ASLEEP WHILE WATCHING TV: SLIGHT CHANCE OF DOZING
HOW LIKELY ARE YOU TO NOD OFF OR FALL ASLEEP WHILE SITTING AND READING: SLIGHT CHANCE OF DOZING
HOW LIKELY ARE YOU TO NOD OFF OR FALL ASLEEP WHILE SITTING INACTIVE IN A PUBLIC PLACE: SLIGHT CHANCE OF DOZING
HOW LIKELY ARE YOU TO NOD OFF OR FALL ASLEEP IN A CAR, WHILE STOPPED FOR A FEW MINUTES IN TRAFFIC: WOULD NEVER DOZE
HOW LIKELY ARE YOU TO NOD OFF OR FALL ASLEEP WHEN YOU ARE A PASSENGER IN A CAR FOR AN HOUR WITHOUT A BREAK: SLIGHT CHANCE OF DOZING
HOW LIKELY ARE YOU TO NOD OFF OR FALL ASLEEP WHILE SITTING QUIETLY AFTER LUNCH WITHOUT ALCOHOL: WOULD NEVER DOZE
HOW LIKELY ARE YOU TO NOD OFF OR FALL ASLEEP WHILE SITTING AND TALKING TO SOMEONE: SLIGHT CHANCE OF DOZING
HOW LIKELY ARE YOU TO NOD OFF OR FALL ASLEEP WHILE LYING DOWN TO REST IN THE AFTERNOON WHEN CIRCUMSTANCES PERMIT: SLIGHT CHANCE OF DOZING

## 2024-01-04 ENCOUNTER — DOCUMENTATION ONLY (OUTPATIENT)
Dept: SLEEP MEDICINE | Facility: CLINIC | Age: 49
End: 2024-01-04
Payer: COMMERCIAL

## 2024-01-09 NOTE — PROGRESS NOTES
This HSAT was performed using a Noxturnal T3 device which recorded snore, sound, movement activity, body position, nasal pressure, oronasal thermal airflow, pulse, oximetry and both chest and abdominal respiratory effort. HSAT data was restricted to the time patient states they were in bed.     HSAT was scored using 1B 4% hypopnea rule.     HST AHI (Non-PAT): 1.3  Snoring was reported as mild and moderate.  Time with SpO2 below 89% was 0 minutes.   Overall signal quality was good     Pt will follow up with sleep provider to determine appropriate therapy.

## 2024-01-09 NOTE — PROCEDURES
"HOME SLEEP STUDY INTERPRETATION        Patient: Geovanna Vargas  MRN: [id number]  YOB: 1975  Study Date: 1/3/2024  PCP/Referring Provider: Woo Perkins MD  Ordering Provider:   Margarita Cruz MD         Indications for Home Study: Geovanna Vargas is a 48 year old female with a history of fatigue who presents with symptoms suggestive of obstructive sleep apnea.    Estimated body mass index is 23.17 kg/m  as calculated from the following:    Height as of 23: 1.626 m (5' 4\").    Weight as of 23: 61.2 kg (135 lb).  Total score - [place]: 6 (1/3/2024  8:49 AM)  STOP-BAN/8        Data: A full night home sleep study was performed recording the standard physiologic parameters including body position, movement, sound, nasal pressure, thermal oral airflow, chest and abdominal movements with respiratory inductance plethysmography, and oxygen saturation by pulse oximetry. Pulse rate was estimated by oximetry recording. This study was considered adequate based on > 4 hours of quality oximetry and respiratory recording. As specified by the AASM Manual for the Scoring of Sleep and Associated events, version 2.3, Rule VIII.D 1B, 4% oxygen desaturation scoring for hypopneas is used as a standard of care on all home sleep apnea testing.        Analysis Time:  416 minutes        Respiration:   Sleep Associated Hypoxemia: sustained hypoxemia was not present. Baseline oxygen saturation was 97%.  Time with saturation less than or equal to 88% was 0 minutes. The lowest oxygen saturation was 89%.   Snoring: Snoring was mild, intermittent.  Respiratory events: The home study revealed a presence of 7 obstructive apneas and 0 mixed and central apneas. There were 2 hypopneas resulting in a combined apnea/hypopnea index [AHI] of 1.3 events per hour.  AHI was 1.9 per hour supine, NA per hour prone, NA per hour on left side, and 0 per hour on right side.   Pattern: Excluding events noted above, respiratory " rate and pattern was Normal.      Position: Percent of time spent: supine - 68%, prone - 0%, on left - 0%, on right - 33%.      Heart Rate: By pulse oximetry normal rate was noted.       Assessment:   Snoring without obstructive sleep apnea.  Sleep associated hypoxemia was not present.    Recommendations:  Suggest optimizing sleep hygiene and avoiding sleep deprivation.        Diagnosis Code(s): Snoring R06.83    Electronically signed by: Margarita Cruz MD, January 9, 2024   Diplomate, American Board of Internal Medicine, Sleep Medicine

## 2024-02-13 ASSESSMENT — SLEEP AND FATIGUE QUESTIONNAIRES
HOW LIKELY ARE YOU TO NOD OFF OR FALL ASLEEP WHEN YOU ARE A PASSENGER IN A CAR FOR AN HOUR WITHOUT A BREAK: MODERATE CHANCE OF DOZING
HOW LIKELY ARE YOU TO NOD OFF OR FALL ASLEEP WHILE SITTING AND TALKING TO SOMEONE: WOULD NEVER DOZE
HOW LIKELY ARE YOU TO NOD OFF OR FALL ASLEEP WHILE WATCHING TV: HIGH CHANCE OF DOZING
HOW LIKELY ARE YOU TO NOD OFF OR FALL ASLEEP WHILE SITTING QUIETLY AFTER LUNCH WITHOUT ALCOHOL: SLIGHT CHANCE OF DOZING
HOW LIKELY ARE YOU TO NOD OFF OR FALL ASLEEP WHILE SITTING AND READING: MODERATE CHANCE OF DOZING
HOW LIKELY ARE YOU TO NOD OFF OR FALL ASLEEP IN A CAR, WHILE STOPPED FOR A FEW MINUTES IN TRAFFIC: SLIGHT CHANCE OF DOZING
HOW LIKELY ARE YOU TO NOD OFF OR FALL ASLEEP WHILE LYING DOWN TO REST IN THE AFTERNOON WHEN CIRCUMSTANCES PERMIT: SLIGHT CHANCE OF DOZING
HOW LIKELY ARE YOU TO NOD OFF OR FALL ASLEEP WHILE SITTING INACTIVE IN A PUBLIC PLACE: MODERATE CHANCE OF DOZING

## 2024-02-20 ENCOUNTER — VIRTUAL VISIT (OUTPATIENT)
Dept: SLEEP MEDICINE | Facility: CLINIC | Age: 49
End: 2024-02-20
Payer: COMMERCIAL

## 2024-02-20 DIAGNOSIS — R06.83 SNORING: Primary | ICD-10-CM

## 2024-02-20 DIAGNOSIS — F51.5 NIGHTMARES: ICD-10-CM

## 2024-02-20 PROCEDURE — 99213 OFFICE O/P EST LOW 20 MIN: CPT | Mod: 95 | Performed by: INTERNAL MEDICINE

## 2024-02-20 ASSESSMENT — PAIN SCALES - GENERAL: PAINLEVEL: NO PAIN (0)

## 2024-02-20 NOTE — PROGRESS NOTES
Virtual Visit Details    Type of service:  Video Visit     Originating Location (pt. Location): Other Work, [place]    Distant Location (provider location):  Off-site  Platform used for Video Visit: Nahun    Chief complaint: Follow-up home sleep apnea testing    History of Present Illness: 48-year-old female with history of daytime fatigue, snoring, occasional nightmares related to breathing.  She underwent home sleep apnea testing and is here to discuss those results.  She spent over 60% of the time sleeping on her back.  This was surprising to her.  There was no significant sleep apnea identified.  Overall AHI was less than 2.  Snoring was present, mild and intermittent.  There was no hypoxemia.    Patient reports that whenever the episodes that she has had in the past have occurred they have been on her back.  She admits to some irregular sleep schedule.  Denies other sleep concerns at this time.     [place] Sleepiness Scale  Total score - [place]: 12 (2/13/2024 10:12 AM)   (Less than 10 normal)    Insomnia Severity Scale  SHELL Total Score: 8  (normal 0-7, mild 8-14, moderate 15-21, severe 22-28)    Past Medical History:   Diagnosis Date    NO ACTIVE PROBLEMS        Allergies   Allergen Reactions    Latex Itching    Seasonal Allergies     Soybeans-Tilactase (Lactase) [Dairy Digestive]      Pt is allergic to Soy Milk       Current Outpatient Medications   Medication    Multiple Vitamin (MULTI VITAMIN DAILY PO)     No current facility-administered medications for this visit.       Social History     Socioeconomic History    Marital status: Single     Spouse name: Not on file    Number of children: Not on file    Years of education: Not on file    Highest education level: Not on file   Occupational History    Not on file   Tobacco Use    Smoking status: Never     Passive exposure: Never    Smokeless tobacco: Never   Vaping Use    Vaping Use: Never used   Substance and Sexual Activity    Alcohol use: Yes     Comment: once  a month maybe    Drug use: No    Sexual activity: Yes     Partners: Male     Birth control/protection: None   Other Topics Concern    Not on file   Social History Narrative    How much exercise per week? Once a week, jogging    How much calcium per day? suplement       How much caffeine per day? Stopped drinking anything with caffeine about a month ago    How much vitamin D per day? 2000 iu    Do you/your family wear seatbelts?  Yes    Do you/your family use safety helmets? Yes    Do you/your family use sunscreen? Yes    Do you/your family keep firearms in the home? No    Do you/your family have a smoke detector(s)? Yes        Do you feel safe in your home? Yes    Has anyone ever touched you in an unwanted manner? No         Zachariah R LPN 7/12/13                 Social Determinants of Health     Financial Resource Strain: Low Risk  (10/25/2023)    Financial Resource Strain     Within the past 12 months, have you or your family members you live with been unable to get utilities (heat, electricity) when it was really needed?: No   Food Insecurity: Low Risk  (10/25/2023)    Food Insecurity     Within the past 12 months, did you worry that your food would run out before you got money to buy more?: No     Within the past 12 months, did the food you bought just not last and you didn t have money to get more?: No   Transportation Needs: Low Risk  (10/25/2023)    Transportation Needs     Within the past 12 months, has lack of transportation kept you from medical appointments, getting your medicines, non-medical meetings or appointments, work, or from getting things that you need?: No   Physical Activity: Not on file   Stress: Not on file   Social Connections: Not on file   Interpersonal Safety: Low Risk  (10/25/2023)    Interpersonal Safety     Do you feel physically and emotionally safe where you currently live?: Yes     Within the past 12 months, have you been hit, slapped, kicked or otherwise physically hurt by someone?:  No     Within the past 12 months, have you been humiliated or emotionally abused in other ways by your partner or ex-partner?: No   Housing Stability: Low Risk  (10/25/2023)    Housing Stability     Do you have housing? : Yes     Are you worried about losing your housing?: No       Family History   Problem Relation Age of Onset    Diabetes Other     Cancer Maternal Uncle         liver ca    Glaucoma No family hx of     Macular Degeneration No family hx of     Diabetes Maternal Grandmother     Kidney Cancer Maternal Grandmother     Diabetes Paternal Uncle     Diabetes Paternal Uncle            EXAM:  There were no vitals taken for this visit.  GENERAL: Alert and no distress  EYES: Eyes grossly normal to inspection.  No discharge or erythema, or obvious scleral/conjunctival abnormalities.  RESP: No audible wheeze, cough, or visible cyanosis.    SKIN: Visible skin clear. No significant rash, abnormal pigmentation or lesions.  NEURO: Cranial nerves grossly intact.  Mentation and speech appropriate for age.  PSYCH: Appropriate affect, tone, and pace of words       HSAT 1/3/2024  Weight 135 lbs BMI 23.17  (68% supine)  AHI 1.3, Lowest O2 Sat 89%      TSH   Date Value Ref Range Status   06/01/2023 3.19 0.30 - 4.20 uIU/mL Final   05/05/2021 2.06 0.30 - 5.00 uIU/mL Final   05/24/2013 81 mcU/mL Final         ASSESSMENT:  48-year-old female with mild intermittent snoring, no significant sleep apnea.  The rare events that did occur occurred predominantly on her back.  Irregular sleep schedule with possible insufficient sleep.    PLAN:  Patient is encouraged to sleep on her sides is much as possible.  She may obtain a body pillow to help encourage this.  Should she have any progression in symptoms consider reevaluation.  Offered reevaluation at this time with a PSG in the sleep lab but declined.  Encouraged regularizing sleep schedule is much as possible ensuring 7 and half hours to 8-hour sleep opportunity.  Follow-up and see  medicine as needed      20 minutes spent by me on the date of the encounter doing chart review, history and exam, documentation and further activities per the note    Margarita Cruz M.D.  Pulmonary/Critical Care/Sleep Medicine    [facility]   Floor 1, [address]   [address] [facility] [place] [address]   Appointments: [phone]    The above note was dictated using voice recognition software and may include typographical errors. Please contact the author for any clarifications.

## 2024-02-20 NOTE — NURSING NOTE
Patient denies any changes since echeck-in regarding medication and allergies and states all information entered during echeck-in remains accurate.    Is the patient currently in the state of [place]? YES    Visit mode:VIDEO    If the visit is dropped, the patient can be reconnected by: VIDEO VISIT: Send to e-mail at: [email or web address]    Will anyone else be joining the visit? NO  (If patient encounters technical issues they should call [phone])    How would you like to obtain your AVS? MyChart    Are changes needed to the allergy or medication list? No, Pt stated no changes to allergies, and Pt stated no med changes    Reason for visit: Follow Up (Sleep study follow up, no updates per pt. Medications/allergies reviewed by pt via [employer], no changes per pt. No pt reported vitals per pt. )    Has patient had flu shot for current/most recent flu season? If so, when? Yes: 10/21/23    Devante Reynolds, Visit Facilitator/MA.

## 2024-02-20 NOTE — PROGRESS NOTES
Chief complaint: follow-up home sleep apnea testing    History of Present Illness: 48-year-old female with history of daytime fatigue, snoring, occasional nightmares related to breathing.  She underwent home sleep apnea testing and is here to discuss those results.  She spent over 60% of the time sleeping on her back.  This was surprising to her.  There was no significant sleep apnea identified.  Overall AHI was less than 2.  Snoring was present, mild and intermittent.  There was no hypoxemia.    Patient reports that whenever the episodes that she has had in the past have occurred that is been on her back.  She admits to some irregular sleep schedule.  Denies other sleep concerns at this time.     [place] Sleepiness Scale  Total score - [place]: 12 (2/13/2024 10:12 AM)   (Less than 10 normal)    Insomnia Severity Scale  SHELL Total Score: 8  (normal 0-7, mild 8-14, moderate 15-21, severe 22-28)    Past Medical History:   Diagnosis Date    NO ACTIVE PROBLEMS        Allergies   Allergen Reactions    Latex Itching    Seasonal Allergies     Soybeans-Tilactase (Lactase) [Dairy Digestive]      Pt is allergic to Soy Milk       Current Outpatient Medications   Medication    Multiple Vitamin (MULTI VITAMIN DAILY PO)     No current facility-administered medications for this visit.       Social History     Socioeconomic History    Marital status: Single     Spouse name: Not on file    Number of children: Not on file    Years of education: Not on file    Highest education level: Not on file   Occupational History    Not on file   Tobacco Use    Smoking status: Never     Passive exposure: Never    Smokeless tobacco: Never   Vaping Use    Vaping Use: Never used   Substance and Sexual Activity    Alcohol use: Yes     Comment: once a month maybe    Drug use: No    Sexual activity: Yes     Partners: Male     Birth control/protection: None   Other Topics Concern    Not on file   Social History Narrative    How much exercise per week?  Once a week, jogging    How much calcium per day? suplement       How much caffeine per day? Stopped drinking anything with caffeine about a month ago    How much vitamin D per day? 2000 iu    Do you/your family wear seatbelts?  Yes    Do you/your family use safety helmets? Yes    Do you/your family use sunscreen? Yes    Do you/your family keep firearms in the home? No    Do you/your family have a smoke detector(s)? Yes        Do you feel safe in your home? Yes    Has anyone ever touched you in an unwanted manner? No         Zachariah R LPN 7/12/13                 Social Determinants of Health     Financial Resource Strain: Low Risk  (10/25/2023)    Financial Resource Strain     Within the past 12 months, have you or your family members you live with been unable to get utilities (heat, electricity) when it was really needed?: No   Food Insecurity: Low Risk  (10/25/2023)    Food Insecurity     Within the past 12 months, did you worry that your food would run out before you got money to buy more?: No     Within the past 12 months, did the food you bought just not last and you didn t have money to get more?: No   Transportation Needs: Low Risk  (10/25/2023)    Transportation Needs     Within the past 12 months, has lack of transportation kept you from medical appointments, getting your medicines, non-medical meetings or appointments, work, or from getting things that you need?: No   Physical Activity: Not on file   Stress: Not on file   Social Connections: Not on file   Interpersonal Safety: Low Risk  (10/25/2023)    Interpersonal Safety     Do you feel physically and emotionally safe where you currently live?: Yes     Within the past 12 months, have you been hit, slapped, kicked or otherwise physically hurt by someone?: No     Within the past 12 months, have you been humiliated or emotionally abused in other ways by your partner or ex-partner?: No   Housing Stability: Low Risk  (10/25/2023)    Housing Stability     Do  you have housing? : Yes     Are you worried about losing your housing?: No       Family History   Problem Relation Age of Onset    Diabetes Other     Cancer Maternal Uncle         liver ca    Glaucoma No family hx of     Macular Degeneration No family hx of     Diabetes Maternal Grandmother     Kidney Cancer Maternal Grandmother     Diabetes Paternal Uncle     Diabetes Paternal Uncle            EXAM:  There were no vitals taken for this visit.  GENERAL: Alert and no distress  EYES: Eyes grossly normal to inspection.  No discharge or erythema, or obvious scleral/conjunctival abnormalities.  RESP: No audible wheeze, cough, or visible cyanosis.    SKIN: Visible skin clear. No significant rash, abnormal pigmentation or lesions.  NEURO: Cranial nerves grossly intact.  Mentation and speech appropriate for age.  PSYCH: Appropriate affect, tone, and pace of words       HSAT 1/3/2024  Weight 135 lbs BMI 23.17  (68% supine)  AHI 1.3, Lowest O2 Sat 89%      TSH   Date Value Ref Range Status   06/01/2023 3.19 0.30 - 4.20 uIU/mL Final   05/05/2021 2.06 0.30 - 5.00 uIU/mL Final   05/24/2013 81 mcU/mL Final         ASSESSMENT:  48-year-old female with mild intermittent snoring, no significant sleep apnea.  The rare events that did occur occurred predominantly on her back.  Irregular sleep schedule with possible insufficient sleep.    PLAN:  Patient is encouraged to sleep on her sides is much as possible.  She may obtain a body pillow to help encourage this.  Should she have any progression in symptoms consider reevaluation.  Offered reevaluation at this time with a PSG in the sleep lab but declined.  Encouraged regularizing sleep schedule is much as possible ensuring 7 and half hours to 8-hour sleep opportunity.  Follow-up and see medicine as needed      *** minutes spent by me on the date of the encounter doing chart review, history and exam, documentation and further activities per the note    Margarita Cruz  M.D.  Pulmonary/Critical Care/Sleep Medicine    [facility]   [address], [address]   [address] [place] [address]   Appointments: [phone]    The above note was dictated using voice recognition software and may include typographical errors. Please contact the author for any clarifications.

## 2024-04-10 ENCOUNTER — OFFICE VISIT (OUTPATIENT)
Dept: INTERNAL MEDICINE | Facility: CLINIC | Age: 49
End: 2024-04-10
Payer: COMMERCIAL

## 2024-04-10 VITALS
BODY MASS INDEX: 24.24 KG/M2 | SYSTOLIC BLOOD PRESSURE: 104 MMHG | WEIGHT: 142 LBS | DIASTOLIC BLOOD PRESSURE: 58 MMHG | HEART RATE: 77 BPM | TEMPERATURE: 98.4 F | OXYGEN SATURATION: 97 % | HEIGHT: 64 IN | RESPIRATION RATE: 16 BRPM

## 2024-04-10 DIAGNOSIS — H81.10 BENIGN PAROXYSMAL POSITIONAL VERTIGO, UNSPECIFIED LATERALITY: Primary | ICD-10-CM

## 2024-04-10 DIAGNOSIS — E78.00 ELEVATED LDL CHOLESTEROL LEVEL: ICD-10-CM

## 2024-04-10 LAB
CHOLEST SERPL-MCNC: 261 MG/DL
FASTING STATUS PATIENT QL REPORTED: YES
HDLC SERPL-MCNC: 50 MG/DL
LDLC SERPL CALC-MCNC: 173 MG/DL
NONHDLC SERPL-MCNC: 211 MG/DL
TRIGL SERPL-MCNC: 189 MG/DL

## 2024-04-10 PROCEDURE — 80061 LIPID PANEL: CPT | Performed by: INTERNAL MEDICINE

## 2024-04-10 PROCEDURE — G2211 COMPLEX E/M VISIT ADD ON: HCPCS | Performed by: INTERNAL MEDICINE

## 2024-04-10 PROCEDURE — 36415 COLL VENOUS BLD VENIPUNCTURE: CPT | Performed by: INTERNAL MEDICINE

## 2024-04-10 PROCEDURE — 99213 OFFICE O/P EST LOW 20 MIN: CPT | Performed by: INTERNAL MEDICINE

## 2024-04-10 NOTE — PATIENT INSTRUCTIONS
48-year-old woman, historically very healthy, works as an  of [employer] science at [employer]    1 issue that were focusing on today April 10, 2024    Benign paroxysmal positional vertigo, classic symptoms  Jose A told me that she began noticing this yesterday, when she bent over and then raised her head back to the upright position and also when she was lying in bed and went from supine to upright head position.  She felt the sensation of motion.  A little bit of ringing in the ears, and when the symptoms were most intense, she felt lightheaded and saw stars.  I told her that was probably a vagal reflex, but the trigger underlying trigger is benign paroxysmal positional vertigo.    I explained to her the physiology and we looked at a diagram of the semicircular canals and the inner ear.  I told her that the main method of management is avoiding the particular rapid head movements that tend to trigger symptoms.  Particular going from supine to upright or from prone to upright.    If symptoms occur while driving, it safest just to pull over and get off the road as soon as possible.    On examination both ear canals and tympanic membranes look normal.    I told her that symptoms usually last 10 to 15 minutes and may go away within an hour.    But if symptoms persist, she could try the Epley maneuver.  I told her there are articles online from leading medical centers about how to perform the Epley maneuver, and also there are videos on YouTube to show how to do it at home.  It is possible that symptoms could transiently get worse while doing the maneuver.     Snoring and sleep disordered breathing, but home sleep study did not demonstrate sleep apnea.  Sleep consult recommended that she focus on sleep position      She told me that sometimes she has a runny nose, but there does not seem to be a nasal congestion problem.     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  She used to be able to pull all nighters with no problem.       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     She told me that mornings really bothersome, and her head feels kind of foggy.    She is a very busy mom, has 2 small kids ages 9 and 6, and sometimes her sleeping hours are a little bit irregular.  She believes she averages approximately 6 hours of sleep per night.       If all of those metabolic factors are okay, then I would advise her to work on improving sleep as a first priority.      Regular exercise and healthy eating helps with sleep.  She should try to keep her sleeping hours as consistent as possible.  Minimize interruptions.  Bedroom environment needs to be optimized for sleep.      History of gestational diabetes with her second pregnancy.  This was in 2017, and she had an elevated A1c of 5.9.  She did take insulin during pregnancy, but no hypoglycemic medication after delivery.  I am going to include an A1c on her next set of blood work.  I told her that gestational diabetes does increase her risk for developing future type 2 diabetes.  It is important that she maintain healthy lifestyle as she ages, which means controlling carbohydrate intake, control weight, and get plenty of exercise.  I reminded her that persons of  ancestry are more prone to develop type 2 diabetes with weight gain.    6-1-2023    Hemoglobin A1C  0.0 - 5.6 % 5.3       History of advanced adenomatous colon polyp, 10 mm ascending colon March 23, 2022, needs 3-year recheck which would be March 2025     Sporadic approximately weekly loose stools or diarrhea, which I think is irritable bowel syndrome.    She avoids lactose.    Upper endoscopy August 9, 2022 revealed small gastric erosions and duodenal lymphocytosis, a nonspecific inflammatory finding. H. pylori test was negative  Afterwards  she took about a years worth of what was probably a proton pump inhibitor, then decided to stop it, but her GI symptoms are not too bad these days     She has noticed that wheat containing foods might make her symptoms worse, so I think is worth an experiment to try to eliminate those components from her diet, regardless of the results of any medical tests.  If her symptoms improve, then she has an additional management factor.     Family history pancreas cancer (mother  2022)       Benign lymph node left side of neck.  I told her that lymph nodes can fluctuate in size in response to immune system stimuli such as a vaccine or a viral infection.  She is not experiencing any fever or other lumps or bumps anywhere that would raise a concern for malignancy or disseminated infection.  She does need routine laboratory tests, so I will include a complete blood count with differential, as well as a metabolic panel.  Her thyroid is palpably normal.     Hyperlipidemia with elevated LDL cholesterol.    2023  LDL Cholesterol Calculated  <=100 mg/dL 159 High      Direct Measure HDL  >=50 mg/dL 43 Low      Triglycerides  <150 mg/dL 248 High      2023  Calcium Scoring: The total Agatston score is 0. A calcium score of zero places the individual in the lowest quartile when compared to an age and gender matched control group and implies a low risk of cardiac events in the next 10 years. (less than 1% per year).      Fibrous breast  BILATERAL FULL FIELD DIGITAL SCREENING MAMMOGRAM WITH TOMOSYNTHESIS  Performed on: 23

## 2024-04-10 NOTE — PROGRESS NOTES
Office Visit - Follow Up   Geovanna Vargas   48 year old female    Date of Visit: 4/10/2024    Chief Complaint   Patient presents with    Dizziness        -------------------------------------------------------------------------------------------------------------------------  Assessment and Plan      48-year-old woman, historically very healthy, works as an  of [employer] science at [employer]    1 issue that were focusing on today April 10, 2024    Benign paroxysmal positional vertigo, classic symptoms  Jose A told me that she began noticing this yesterday, when she bent over and then raised her head back to the upright position and also when she was lying in bed and went from supine to upright head position.  She felt the sensation of motion.  A little bit of ringing in the ears, and when the symptoms were most intense, she felt lightheaded and saw stars.  I told her that was probably a vagal reflex, but the trigger underlying trigger is benign paroxysmal positional vertigo.    I explained to her the physiology and we looked at a diagram of the semicircular canals and the inner ear.  I told her that the main method of management is avoiding the particular rapid head movements that tend to trigger symptoms.  Particular going from supine to upright or from prone to upright.    If symptoms occur while driving, it safest just to pull over and get off the road as soon as possible.    On examination both ear canals and tympanic membranes look normal.    I told her that symptoms usually last 10 to 15 minutes and may go away within an hour.    But if symptoms persist, she could try the Epley maneuver.  I told her there are articles online from leading medical centers about how to perform the Epley maneuver, and also there are videos on YouTube to show how to do it at home.  It is possible that symptoms could transiently get worse while doing the maneuver.     Snoring and sleep disordered  breathing, but home sleep study did not demonstrate sleep apnea.  Sleep consult recommended that she focus on sleep position      She told me that sometimes she has a runny nose, but there does not seem to be a nasal congestion problem.     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  She used to be able to pull all nighters with no problem.       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     She told me that mornings really bothersome, and her head feels kind of foggy.    She is a very busy mom, has 2 small kids ages 9 and 6, and sometimes her sleeping hours are a little bit irregular.  She believes she averages approximately 6 hours of sleep per night.       If all of those metabolic factors are okay, then I would advise her to work on improving sleep as a first priority.      Regular exercise and healthy eating helps with sleep.  She should try to keep her sleeping hours as consistent as possible.  Minimize interruptions.  Bedroom environment needs to be optimized for sleep.      History of gestational diabetes with her second pregnancy.  This was in 2017, and she had an elevated A1c of 5.9.  She did take insulin during pregnancy, but no hypoglycemic medication after delivery.  I am going to include an A1c on her next set of blood work.  I told her that gestational diabetes does increase her risk for developing future type 2 diabetes.  It is important that she maintain healthy lifestyle as she ages, which means controlling carbohydrate intake, control weight, and get plenty of exercise.  I reminded her that persons of  ancestry are more prone to develop type 2 diabetes with weight gain.    6-1-2023    Hemoglobin A1C  0.0 - 5.6 % 5.3     History of advanced adenomatous colon polyp, 10 mm ascending colon March 23, 2022, needs 3-year recheck which would be  2025     Sporadic approximately weekly loose stools or diarrhea, which I think is irritable bowel syndrome.    She avoids lactose.    Upper endoscopy 2022 revealed small gastric erosions and duodenal lymphocytosis, a nonspecific inflammatory finding. H. pylori test was negative  Afterwards she took about a years worth of what was probably a proton pump inhibitor, then decided to stop it, but her GI symptoms are not too bad these days     She has noticed that wheat containing foods might make her symptoms worse, so I think is worth an experiment to try to eliminate those components from her diet, regardless of the results of any medical tests.  If her symptoms improve, then she has an additional management factor.     Family history pancreas cancer (mother  2022)       Benign lymph node left side of neck.  I told her that lymph nodes can fluctuate in size in response to immune system stimuli such as a vaccine or a viral infection.  She is not experiencing any fever or other lumps or bumps anywhere that would raise a concern for malignancy or disseminated infection.  She does need routine laboratory tests, so I will include a complete blood count with differential, as well as a metabolic panel.  Her thyroid is palpably normal.     Hyperlipidemia with elevated LDL cholesterol.    2023  LDL Cholesterol Calculated  <=100 mg/dL 159 High      Direct Measure HDL  >=50 mg/dL 43 Low      Triglycerides  <150 mg/dL 248 High      2023  Calcium Scoring: The total Agatston score is 0. A calcium score of zero places the individual in the lowest quartile when compared to an age and gender matched control group and implies a low risk of cardiac events in the next 10 years. (less than 1% per year).      Fibrous breast  BILATERAL FULL FIELD DIGITAL SCREENING MAMMOGRAM WITH TOMOSYNTHESIS  Performed on:  6/22/23    --------------------------------------------------------------------------------------------------------------------------  History of Present Illness  This 48 year old old     Jose A told me that she began noticing this yesterday, when she bent over and then raised her head back to the upright position and also when she was lying in bed and went from supine to upright head position.  She felt the sensation of motion.  A little bit of ringing in the ears, and when the symptoms were most intense, she felt lightheaded and saw stars.  I told her that was probably a vagal reflex, but the trigger underlying trigger is benign paroxysmal positional vertigo.      Wt Readings from Last 3 Encounters:   04/10/24 64.4 kg (142 lb)   11/22/23 61.2 kg (135 lb)   10/25/23 62.6 kg (138 lb)     BP Readings from Last 3 Encounters:   04/10/24 104/58   10/25/23 118/68   06/01/23 100/60     ---------------------------------------------------------------------------------------------------------------------------    Medications, Allergies, Social, and Problem List       No current outpatient medications on file.     Allergies   Allergen Reactions    Latex Itching    Seasonal Allergies     Soybeans-Tilactase (Lactase) [Dairy Digestive]      Pt is allergic to Soy Milk     Social History     Tobacco Use    Smoking status: Never     Passive exposure: Never    Smokeless tobacco: Never   Vaping Use    Vaping status: Never Used   Substance Use Topics    Alcohol use: Yes     Comment: once a month maybe    Drug use: No     Patient Active Problem List   Diagnosis    GDM, class A2    History of insulin controlled gestational diabetes mellitus (GDM)    Prenatal care, subsequent pregnancy    Vaginal bleeding in pregnancy    Vaginal delivery    Family history of pancreatic cancer    Irritable bowel syndrome with diarrhea    Chronic fatigue    Difficulty concentrating        Reviewed, reconciled and updated       Physical Exam   General  "Appearance:       /58 (BP Location: Right arm, Patient Position: Sitting, Cuff Size: Adult Regular)   Pulse 77   Temp 98.4  F (36.9  C)   Resp 16   Ht 1.626 m (5' 4\")   Wt 64.4 kg (142 lb)   LMP  (LMP Unknown)   SpO2 97%   BMI 24.37 kg/m      Appears well.  Both tympanic canals clear tympanic membranes look normal.  No motor impairment, gait steady and smooth     Additional Information   I spent 20 minutes on this encounter, including reviewing interval history since last visit, examining the patient, explaining and counseling the issues enumerated in the Assessment and Plan (patient given a copy), ordering indicated tests    The longitudinal plan of care for the diagnosis(es)/condition(s) as documented were addressed during this visit. Due to the added complexity in care, I will continue to support Geovanna in the subsequent management and with ongoing continuity of care.       DILCIA SAGE MD, MD      Signed Electronically by: DILCIA SAGE MD    "

## 2024-05-21 ENCOUNTER — LAB REQUISITION (OUTPATIENT)
Dept: LAB | Facility: CLINIC | Age: 49
End: 2024-05-21

## 2024-05-21 DIAGNOSIS — N84.1 POLYP OF CERVIX UTERI: ICD-10-CM

## 2024-05-21 PROCEDURE — 88305 TISSUE EXAM BY PATHOLOGIST: CPT | Performed by: PATHOLOGY

## 2024-05-23 LAB
PATH REPORT.COMMENTS IMP SPEC: NORMAL
PATH REPORT.COMMENTS IMP SPEC: NORMAL
PATH REPORT.FINAL DX SPEC: NORMAL
PATH REPORT.GROSS SPEC: NORMAL
PATH REPORT.MICROSCOPIC SPEC OTHER STN: NORMAL
PATH REPORT.RELEVANT HX SPEC: NORMAL
PHOTO IMAGE: NORMAL

## 2024-06-19 ENCOUNTER — MYC MEDICAL ADVICE (OUTPATIENT)
Dept: INTERNAL MEDICINE | Facility: CLINIC | Age: 49
End: 2024-06-19
Payer: COMMERCIAL

## 2024-06-19 DIAGNOSIS — E78.00 ELEVATED LDL CHOLESTEROL LEVEL: Primary | ICD-10-CM

## 2024-06-19 RX ORDER — ROSUVASTATIN CALCIUM 10 MG/1
10 TABLET, COATED ORAL DAILY
Qty: 90 TABLET | Refills: 3 | Status: SHIPPED | OUTPATIENT
Start: 2024-06-19

## 2024-06-23 ENCOUNTER — HEALTH MAINTENANCE LETTER (OUTPATIENT)
Age: 49
End: 2024-06-23

## 2024-07-22 ENCOUNTER — PATIENT OUTREACH (OUTPATIENT)
Dept: CARE COORDINATION | Facility: CLINIC | Age: 49
End: 2024-07-22
Payer: COMMERCIAL

## 2024-09-12 ENCOUNTER — VIRTUAL VISIT (OUTPATIENT)
Dept: INTERNAL MEDICINE | Facility: CLINIC | Age: 49
End: 2024-09-12
Payer: COMMERCIAL

## 2024-09-12 DIAGNOSIS — G89.29 CHRONIC MIDLINE POSTERIOR NECK PAIN: Primary | ICD-10-CM

## 2024-09-12 DIAGNOSIS — M54.2 CHRONIC MIDLINE POSTERIOR NECK PAIN: Primary | ICD-10-CM

## 2024-09-12 PROCEDURE — 99213 OFFICE O/P EST LOW 20 MIN: CPT | Mod: 95 | Performed by: INTERNAL MEDICINE

## 2024-09-12 NOTE — PROGRESS NOTES
Geovanna is a 49 year old who is being evaluated via a billable video visit.      Subjective   Geovanna is a 49 year old, presenting for the following health issues:  Neck Pain (Neck pain x a few years. )        9/12/2024     7:21 AM   Additional Questions   Roomed by Lacey RICHARD   Accompanied by kena     History of Present Illness       Reason for visit:  Neck pain  Symptom onset:  More than a month  Symptoms include:  Neck pain  Symptom intensity:  Moderate  Symptom progression:  Staying the same  Had these symptoms before:  Yes  Has tried/received treatment for these symptoms:  No  What makes it worse:  Na  What makes it better:  Na   She is taking medications regularly.         Objective       Vitals:  No vitals were obtained today due to virtual visit.    Physical Exam   She demonstrated the painful areas of her posterior neck, and the seem to correspond to the muscular insertions on the posterior base of the skull    Assessment and plan    Cervicalgia, posterior neck pain likely from muscle strain and poor posture, but consider possibility that she may have some degenerative arthritis in her C-spine.    She is interested in getting a neck x-ray which I have ordered for her.  The purpose would be to see what degree of degenerative arthritic changes she may already have.  Even if such is found, the intervention at this point would be conservative with neck muscle strengthening exercises, possibly organized physical therapy.  I demonstrated for her on camera the chin tuck maneuver, and also showed her some exercises to strengthen upper back muscles to address slouched shoulders.    I suggest that she take a look at [facility] online, and do some of the home-based exercises for the neck.    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 Location):  Home  Start time 07:30  End 07:45    Distant Location (provider location):  On-site  Platform used for Video Visit: Nahun  Signed Electronically by: DILCIA SAGE MD

## 2024-11-16 ENCOUNTER — IMMUNIZATION (OUTPATIENT)
Dept: FAMILY MEDICINE | Facility: CLINIC | Age: 49
End: 2024-11-16
Payer: COMMERCIAL

## 2024-11-16 PROCEDURE — 91320 SARSCV2 VAC 30MCG TRS-SUC IM: CPT

## 2024-11-16 PROCEDURE — 90471 IMMUNIZATION ADMIN: CPT

## 2024-11-16 PROCEDURE — 90673 RIV3 VACCINE NO PRESERV IM: CPT

## 2024-11-16 PROCEDURE — 90480 ADMN SARSCOV2 VAC 1/ONLY CMP: CPT

## 2025-02-04 ENCOUNTER — OFFICE VISIT (OUTPATIENT)
Dept: DERMATOLOGY | Facility: CLINIC | Age: 50
End: 2025-02-04
Payer: COMMERCIAL

## 2025-02-04 DIAGNOSIS — L82.1 SK (SEBORRHEIC KERATOSIS): Primary | ICD-10-CM

## 2025-02-04 DIAGNOSIS — D18.01 CHERRY ANGIOMA: ICD-10-CM

## 2025-02-04 DIAGNOSIS — L81.4 SOLAR LENTIGO: ICD-10-CM

## 2025-02-04 DIAGNOSIS — D22.9 MULTIPLE BENIGN MELANOCYTIC NEVI: ICD-10-CM

## 2025-02-04 ASSESSMENT — PAIN SCALES - GENERAL: PAINLEVEL_OUTOF10: NO PAIN (0)

## 2025-02-04 NOTE — PROGRESS NOTES
[facility] Dermatology Note  Encounter Date: Feb 4, 2025  Office Visit     Dermatology Problem List:  FBSE 2/4/25  # Benign findings: DF, cherry angioma, melanocytic nevi  ____________________________________________    Assessment & Plan:  # Benign lesions (cherry angiomas, seborrheic keratoses, lentigines, clinically banal appearing melanocytic nevi)  - Reassurance provided and benign nature of condition discussed  - ABCDs of melanoma were discussed and self skin checks were advised  - Signs and symptoms of NMSC discussed  - Sun precaution was advised including the use of sun screens of SPF 30 or higher, sun protective clothing, and avoidance of tanning beds  - Handouts provided      # Pigmentary mosaicism (working diagnosis); doubt blaschkitis, erythema ab igne  - left lower abdomen  - Monitor: Patient to continue monitoring at home and will contact the clinic for any changes.  - NTD: No further management at this time.    Follow-up: 2 year(s) in-person, or earlier for new or changing lesions    Staff and Resident:     Ricarda Polo MD  PGY-2 [facility]ermatology     Staff Physician Comments:   I saw and evaluated the patient with the resident and I agree with the assessment and plan.  I was present for the examination. I have made edits if needed.    Lc Mars MD  Staff Dermatologist and Dermatopathologist  , Department of Dermatology   ____________________________________________    CC: Skin Check (Geovanna is here today for a skin check; Spot of concern on left hip.)    HPI:  Ms. Geovanna Vargas is a(n) 49 year old female who presents today as a new patient for FBSE.        Patient is otherwise feeling well, without additional skin concerns.    Labs Reviewed:  N/A    Physical Exam:  Vitals: There were no vitals taken for this visit.  SKIN: Total skin excluding the undergarment areas was performed. The exam included the head/face, neck, both arms,  chest, back, abdomen, both legs, digits and/or nails.   - tan to brown incomplete reticular network on the left lower abdomen   - There are dome shaped bright red papules on the trunk.   - There is a 1 x 0.5 cm firm tan/flesh colored papule that dimples with lateral pressure on the left hip.  - Multiple regular brown pigmented macules and papules are identified on the extremities and trunk.    - No other lesions of concern on areas examined.     Medications:  Current Outpatient Medications   Medication Sig Dispense Refill    rosuvastatin (CRESTOR) 10 MG tablet Take 1 tablet (10 mg) by mouth daily 90 tablet 3     No current facility-administered medications for this visit.      Past Medical History:   Patient Active Problem List   Diagnosis    GDM, class A2    History of insulin controlled gestational diabetes mellitus (GDM)    Prenatal care, subsequent pregnancy    Vaginal bleeding in pregnancy    Vaginal delivery    Family history of pancreatic cancer    Irritable bowel syndrome with diarrhea    Chronic fatigue    Difficulty concentrating     Past Medical History:   Diagnosis Date    NO ACTIVE PROBLEMS         CC Referred Self, MD  No address on file on close of this encounter.

## 2025-02-04 NOTE — NURSING NOTE
Dermatology Rooming Note    Geovanna Vargas's goals for this visit include:   Chief Complaint   Patient presents with    Skin Check     Geovanna is here today for a skin check; Spot of concern on left hip.     Dawit Turner, Visit Facilitator

## 2025-02-04 NOTE — LETTER
2/4/2025       RE: Geovanna Vargas  [address] [id number]     Dear Colleague,    Thank you for referring your patient, Geovanna Vargas, to the [facility] [place] at [facility]. Please see a copy of my visit note below.    [facility] Dermatology Note  Encounter Date: Feb 4, 2025  Office Visit     Dermatology Problem List:  FBSE 2/4/25  # Benign findings: DF, cherry angioma, melanocytic nevi  ____________________________________________    Assessment & Plan:  # Benign lesions (cherry angiomas, seborrheic keratoses, lentigines, clinically banal appearing melanocytic nevi)  - Reassurance provided and benign nature of condition discussed  - ABCDs of melanoma were discussed and self skin checks were advised  - Signs and symptoms of NMSC discussed  - Sun precaution was advised including the use of sun screens of SPF 30 or higher, sun protective clothing, and avoidance of tanning beds  - Handouts provided      # Pigmentary mosaicism (working diagnosis); doubt blaschkitis, erythema ab igne  - left lower abdomen  - Monitor: Patient to continue monitoring at home and will contact the clinic for any changes.  - NTD: No further management at this time.    Follow-up: 2 year(s) in-person, or earlier for new or changing lesions    Staff and Resident:     Ricarda Polo MD  PGY-2 [facility] Dermatology     Staff Physician Comments:   I saw and evaluated the patient with the resident and I agree with the assessment and plan.  I was present for the examination. I have made edits if needed.    Lc Mars MD  Staff Dermatologist and Dermatopathologist  , Department of Dermatology   ____________________________________________    CC: Skin Check (Geovanna is here today for a skin check; Spot of concern on left hip.)    HPI:  Ms. Geovanna Vargas is a(n) 49 year old female who presents today  as a new patient for FBSE.        Patient is otherwise feeling well, without additional skin concerns.    Labs Reviewed:  N/A    Physical Exam:  Vitals: There were no vitals taken for this visit.  SKIN: Total skin excluding the undergarment areas was performed. The exam included the head/face, neck, both arms, chest, back, abdomen, both legs, digits and/or nails.   - tan to brown incomplete reticular network on the left lower abdomen   - There are dome shaped bright red papules on the trunk.   - There is a 1 x 0.5 cm firm tan/flesh colored papule that dimples with lateral pressure on the left hip.  - Multiple regular brown pigmented macules and papules are identified on the extremities and trunk.    - No other lesions of concern on areas examined.     Medications:  Current Outpatient Medications   Medication Sig Dispense Refill     rosuvastatin (CRESTOR) 10 MG tablet Take 1 tablet (10 mg) by mouth daily 90 tablet 3     No current facility-administered medications for this visit.      Past Medical History:   Patient Active Problem List   Diagnosis     GDM, class A2     History of insulin controlled gestational diabetes mellitus (GDM)     Prenatal care, subsequent pregnancy     Vaginal bleeding in pregnancy     Vaginal delivery     Family history of pancreatic cancer     Irritable bowel syndrome with diarrhea     Chronic fatigue     Difficulty concentrating     Past Medical History:   Diagnosis Date     NO ACTIVE PROBLEMS         CC Referred Self, MD  No address on file on close of this encounter.      Again, thank you for allowing me to participate in the care of your patient.      Sincerely,    Lc Mars MD

## 2025-04-21 ENCOUNTER — PATIENT OUTREACH (OUTPATIENT)
Dept: CARE COORDINATION | Facility: CLINIC | Age: 50
End: 2025-04-21
Payer: COMMERCIAL

## 2025-05-05 ENCOUNTER — PATIENT OUTREACH (OUTPATIENT)
Dept: CARE COORDINATION | Facility: CLINIC | Age: 50
End: 2025-05-05
Payer: COMMERCIAL

## 2025-06-03 ENCOUNTER — TRANSFERRED RECORDS (OUTPATIENT)
Dept: ADMINISTRATIVE | Facility: CLINIC | Age: 50
End: 2025-06-03
Payer: COMMERCIAL

## 2025-06-03 NOTE — PROCEDURES
[facility]   [address], [address], [place] [address]     Patient Name: Geovanna Vargas  Gender:   Female  Exam Date: 06/03/2025 Visit Number:   [id number]  Age: 49 Years 9 Months YOB: 1975  Attending MD: Frantz Reynolds DO Medical Record#:   [id number]  -----------------------------------------------------------------------------------------------------------------------------   Procedure: Colonoscopy   Indications: Previous advanced adenomatous polyp(s), high risk colorectal cancer screening  Referring MD: Referral Self  Primary MD:      Woo Perkins MD   Medications: Intra Procedure Medications:   Patient received monitored anesthesia care.     Complications: No immediate complications  ______________________________________________________________________________  Procedure:   An examination of the heart and lungs was performed and found to be within acceptable limits.  The patient was therefore deemed a reasonable candidate for endoscopy and monitored anesthesia care.   The risks, benefits and plan of the procedure were discussed with the patient and/or patient representative and all questions were answered.  After obtaining informed consent, the patient received monitored anesthesia care and I passed the scope   without difficulty via the rectum to the ileum.  The appendiceal orifice and ic valve were identified.  The scope was retroflexed during the examination  The quality of the prep was good  (Miralax/Gatorade/2 tablets Bisacodyl/Magnesium Citrate).    This was a complete examination throughout the entire colon.    Findings:    Small internal hemorrhoids without bleeding.    The entire colon and terminal ileum was endoscopically normal.    Impression:  History of adenomatous polyp of colon   Hemorrhoids, internal     Plan  Repeat colonoscopy in 5 years for polyp surveillance.    We will attempt to contact you at appropriate intervals via [place] mail.  We may not be able to  find you or contact you at that time, therefore you should know that the responsibility for following our recommendation rests with you.  If you don't hear from us at the time your procedure is due, please contact our office to schedule an appointment.  If your contact information should change, please contact our office so that we can update your records.        Electronically signed by:___________________  Frantz Reynolds DO                 06/03/2025    Medications:  Medication Dose Sig Description PRN Status PRN Reason Comments   Allegra Allergy UNKNOWN  N  taking as directed   multivitamin UNKNOWN  N  taking as directed     Allergies:  Medication Name Ingredient Reaction Comment    LATEX Itching      Vital Signs:  Date Time Systolic Diastolic Height Weight BMI   06/03/2025 1245  69 64 in 128.99 22.10     Smoking Status:    Use Status Type Smoking Status Usage Per Day Years Used Total Pack Years   no/never  Never smoker      Race:    Preferred Language:  English      cc: Woo Perkins MD  cc: Woo Perkins MD        [facility] [phone]

## 2025-07-04 DIAGNOSIS — E78.00 ELEVATED LDL CHOLESTEROL LEVEL: ICD-10-CM

## 2025-07-07 RX ORDER — ROSUVASTATIN CALCIUM 10 MG/1
10 TABLET, COATED ORAL DAILY
Qty: 90 TABLET | Refills: 3 | Status: SHIPPED | OUTPATIENT
Start: 2025-07-07